# Patient Record
Sex: MALE | Race: BLACK OR AFRICAN AMERICAN | NOT HISPANIC OR LATINO | Employment: FULL TIME | URBAN - METROPOLITAN AREA
[De-identification: names, ages, dates, MRNs, and addresses within clinical notes are randomized per-mention and may not be internally consistent; named-entity substitution may affect disease eponyms.]

---

## 2017-12-29 ENCOUNTER — HOSPITAL ENCOUNTER (EMERGENCY)
Facility: OTHER | Age: 57
Discharge: HOME | End: 2017-12-29
Attending: EMERGENCY MEDICINE
Payer: MEDICAID

## 2017-12-29 VITALS
TEMPERATURE: 98 F | HEIGHT: 70 IN | SYSTOLIC BLOOD PRESSURE: 151 MMHG | RESPIRATION RATE: 19 BRPM | WEIGHT: 180 LBS | DIASTOLIC BLOOD PRESSURE: 87 MMHG | HEART RATE: 85 BPM | BODY MASS INDEX: 25.77 KG/M2 | OXYGEN SATURATION: 100 %

## 2017-12-29 DIAGNOSIS — L73.8 FOLLICULITIS BARBAE: Primary | ICD-10-CM

## 2017-12-29 PROCEDURE — 99283 EMERGENCY DEPT VISIT LOW MDM: CPT

## 2017-12-29 RX ORDER — MINOCYCLINE HYDROCHLORIDE 100 MG/1
100 CAPSULE ORAL EVERY 12 HOURS
Qty: 20 CAPSULE | Refills: 0 | Status: SHIPPED | OUTPATIENT
Start: 2017-12-29 | End: 2018-04-17

## 2017-12-29 NOTE — ED PROVIDER NOTES
Encounter Date: 12/29/2017       History     Chief Complaint   Patient presents with    Rash     reports rash, knots to face and head x  1 week     A 57-year-old male who presents to the ED with complaints of a rash to his face and head for 1 week.  Patient has a history of Barbe folliculitis. He states the rash to his beard has..  Patient also reports a rash to his scalp.  Patient denies fever or chills.  Patient states his doctor usually prescribes minocycline when this flares up.  Patient has a history of Paget's disease.           Review of patient's allergies indicates:  No Known Allergies  Past Medical History:   Diagnosis Date    Paget disease of bone      History reviewed. No pertinent surgical history.  Family History   Problem Relation Age of Onset    Diabetes Mother     Diabetes Father     Hypertension Sister     Hypertension Brother     Hypertension Sister      Social History   Substance Use Topics    Smoking status: Former Smoker    Smokeless tobacco: Never Used    Alcohol use Yes     Review of Systems   Constitutional: Negative for fever.   HENT: Negative for sore throat.    Respiratory: Negative for shortness of breath.    Cardiovascular: Negative for chest pain.   Gastrointestinal: Negative for nausea.   Genitourinary: Negative for dysuria.   Musculoskeletal: Negative for back pain.   Skin: Positive for rash.   Neurological: Negative for weakness.   Hematological: Does not bruise/bleed easily.   All other systems reviewed and are negative.      Physical Exam     Initial Vitals [12/29/17 1509]   BP Pulse Resp Temp SpO2   (!) 151/87 85 19 97.6 °F (36.4 °C) 100 %      MAP       108.33         Physical Exam    Nursing note and vitals reviewed.  Constitutional: Vital signs are normal. He appears well-developed. He is cooperative.  Non-toxic appearance. He does not have a sickly appearance. He does not appear ill. No distress.   HENT:   Head: Normocephalic and atraumatic.   Right Ear: External  ear normal.   Left Ear: External ear normal.   Nose: Nose normal.   Mouth/Throat: Oropharynx is clear and moist.   Follicular inflammation left and right side of beard. No evidence of an abscess.   Scalp with dry patches noted.    Eyes: Conjunctivae and lids are normal. Pupils are equal, round, and reactive to light.   Neck: Normal range of motion. Neck supple.   Cardiovascular: Normal rate, regular rhythm and normal heart sounds.   Pulmonary/Chest: Effort normal and breath sounds normal.   Abdominal: Soft. Normal appearance and bowel sounds are normal. He exhibits no distension. There is no CVA tenderness.   Musculoskeletal: Normal range of motion.   Neurological: He is alert and oriented to person, place, and time.   Skin: Skin is warm, dry and intact.   Psychiatric: He has a normal mood and affect. Cognition and memory are normal.         ED Course   Procedures  Labs Reviewed - No data to display          Medical Decision Making:   Initial Assessment:   A 57-year-old male who presents to the ED with a rash to his face and scalp ×1 week.  Patient denies fever or chills.  Patient has had similar symptoms in the past.  Patient states his dermatologist usually treats them with minocycline.  Patient recently relocated to this area from [place].  Patient needs a referral to dermatologist.   Differential Diagnosis:   Contact dermatitis  Folliculitis  ED Management:  Physical exam.  Pt discharged home on minocycline.  Patient referred to PCP and dermatologist for follow-up in 5 days.  Patient to return to ED if symptoms worsen.                   ED Course      Clinical Impression:   The encounter diagnosis was Folliculitis barbae.                           SOREN Pastrana  12/30/17 1153

## 2017-12-29 NOTE — DISCHARGE INSTRUCTIONS
Use medicated shampoo on scalp.  Follow-up with dermatologist in 5 days or ASAP.  Return to ED if symptoms worsne.

## 2018-03-29 ENCOUNTER — OFFICE VISIT (OUTPATIENT)
Dept: INTERNAL MEDICINE | Facility: CLINIC | Age: 58
End: 2018-03-29
Payer: MEDICAID

## 2018-03-29 ENCOUNTER — LAB VISIT (OUTPATIENT)
Dept: LAB | Facility: HOSPITAL | Age: 58
End: 2018-03-29
Payer: MEDICAID

## 2018-03-29 VITALS
DIASTOLIC BLOOD PRESSURE: 92 MMHG | HEART RATE: 84 BPM | SYSTOLIC BLOOD PRESSURE: 152 MMHG | WEIGHT: 189.13 LBS | BODY MASS INDEX: 27.08 KG/M2 | HEIGHT: 70 IN

## 2018-03-29 DIAGNOSIS — M75.81 TENDINITIS OF RIGHT ROTATOR CUFF: ICD-10-CM

## 2018-03-29 DIAGNOSIS — M88.9 PAGET'S BONE DISEASE: Primary | ICD-10-CM

## 2018-03-29 DIAGNOSIS — R35.0 FREQUENCY OF URINATION: ICD-10-CM

## 2018-03-29 DIAGNOSIS — B35.1 TOENAIL FUNGUS: ICD-10-CM

## 2018-03-29 LAB
ALBUMIN SERPL BCP-MCNC: 4.1 G/DL
ALP SERPL-CCNC: 189 U/L
ALT SERPL W/O P-5'-P-CCNC: 9 U/L
ANION GAP SERPL CALC-SCNC: 8 MMOL/L
AST SERPL-CCNC: 17 U/L
BASOPHILS # BLD AUTO: 0.04 K/UL
BASOPHILS NFR BLD: 0.8 %
BILIRUB SERPL-MCNC: 0.3 MG/DL
BILIRUB UR QL STRIP: NEGATIVE
BUN SERPL-MCNC: 22 MG/DL
CALCIUM SERPL-MCNC: 9.7 MG/DL
CHLORIDE SERPL-SCNC: 103 MMOL/L
CLARITY UR REFRACT.AUTO: CLEAR
CO2 SERPL-SCNC: 30 MMOL/L
COLOR UR AUTO: ABNORMAL
COMPLEXED PSA SERPL-MCNC: 5.6 NG/ML
CREAT SERPL-MCNC: 1.1 MG/DL
DIFFERENTIAL METHOD: ABNORMAL
EOSINOPHIL # BLD AUTO: 0.1 K/UL
EOSINOPHIL NFR BLD: 1.2 %
ERYTHROCYTE [DISTWIDTH] IN BLOOD BY AUTOMATED COUNT: 14.3 %
EST. GFR  (AFRICAN AMERICAN): >60 ML/MIN/1.73 M^2
EST. GFR  (NON AFRICAN AMERICAN): >60 ML/MIN/1.73 M^2
ESTIMATED AVG GLUCOSE: 114 MG/DL
GLUCOSE SERPL-MCNC: 64 MG/DL
GLUCOSE UR QL STRIP: NEGATIVE
HBA1C MFR BLD HPLC: 5.6 %
HCT VFR BLD AUTO: 44.5 %
HGB BLD-MCNC: 14 G/DL
HGB UR QL STRIP: ABNORMAL
KETONES UR QL STRIP: NEGATIVE
LEUKOCYTE ESTERASE UR QL STRIP: NEGATIVE
LYMPHOCYTES # BLD AUTO: 2.2 K/UL
LYMPHOCYTES NFR BLD: 45.4 %
MCH RBC QN AUTO: 27.2 PG
MCHC RBC AUTO-ENTMCNC: 31.5 G/DL
MCV RBC AUTO: 86 FL
MICROSCOPIC COMMENT: NORMAL
MONOCYTES # BLD AUTO: 0.3 K/UL
MONOCYTES NFR BLD: 7 %
NEUTROPHILS # BLD AUTO: 2.2 K/UL
NEUTROPHILS NFR BLD: 45.4 %
NITRITE UR QL STRIP: NEGATIVE
NRBC BLD-RTO: 0 /100 WBC
PH UR STRIP: 6 [PH] (ref 5–8)
PLATELET # BLD AUTO: 235 K/UL
PMV BLD AUTO: 9.9 FL
POTASSIUM SERPL-SCNC: 4.4 MMOL/L
PROT SERPL-MCNC: 8 G/DL
PROT UR QL STRIP: NEGATIVE
RBC # BLD AUTO: 5.15 M/UL
RBC #/AREA URNS AUTO: 1 /HPF (ref 0–4)
SODIUM SERPL-SCNC: 141 MMOL/L
SP GR UR STRIP: 1 (ref 1–1.03)
SQUAMOUS #/AREA URNS AUTO: 0 /HPF
URN SPEC COLLECT METH UR: ABNORMAL
UROBILINOGEN UR STRIP-ACNC: NEGATIVE EU/DL
WBC # BLD AUTO: 4.89 K/UL
WBC #/AREA URNS AUTO: 0 /HPF (ref 0–5)

## 2018-03-29 PROCEDURE — 87086 URINE CULTURE/COLONY COUNT: CPT

## 2018-03-29 PROCEDURE — 85025 COMPLETE CBC W/AUTO DIFF WBC: CPT

## 2018-03-29 PROCEDURE — 81001 URINALYSIS AUTO W/SCOPE: CPT

## 2018-03-29 PROCEDURE — 80053 COMPREHEN METABOLIC PANEL: CPT

## 2018-03-29 PROCEDURE — 99215 OFFICE O/P EST HI 40 MIN: CPT | Mod: PBBFAC | Performed by: HOSPITALIST

## 2018-03-29 PROCEDURE — 83036 HEMOGLOBIN GLYCOSYLATED A1C: CPT

## 2018-03-29 PROCEDURE — 99214 OFFICE O/P EST MOD 30 MIN: CPT | Mod: S$PBB,,, | Performed by: HOSPITALIST

## 2018-03-29 PROCEDURE — 99999 PR PBB SHADOW E&M-EST. PATIENT-LVL V: CPT | Mod: PBBFAC,,, | Performed by: HOSPITALIST

## 2018-03-29 PROCEDURE — 36415 COLL VENOUS BLD VENIPUNCTURE: CPT

## 2018-03-29 PROCEDURE — 84153 ASSAY OF PSA TOTAL: CPT

## 2018-03-29 RX ORDER — PRENATAL VIT 91/IRON/FOLIC/DHA 28-975-200
COMBINATION PACKAGE (EA) ORAL 2 TIMES DAILY
Refills: 0 | COMMUNITY
Start: 2018-03-29 | End: 2019-03-21

## 2018-03-29 NOTE — PATIENT INSTRUCTIONS
Low-Salt Choices  Eating salt (sodium) can make your body retain too much water. Excess water makes your heart work harder. Canned, packaged, and frozen foods are easy to prepare, but they are often high in sodium. Here are some ideas for low-salt foods you can easily prepare yourself.    For Breakfast  · Fruit or fruit juice  · Bread or an English muffin  · Shredded wheat  · Corn tortillas  · Steamed rice, unsalted  · Hot cereal, regular (not instant) made without salt  Stay away from:  · Sausage, rouse, ham  · Flour tortillas  · Packaged muffins, pancakes, and biscuits  For Lunch and Dinner  · Fresh fish, chicken, turkey, or meat-- baked, broiled, or roasted without salt  · Dry beans, cooked without salt  · Tofu, stir-fried without salt  Stay away from:  · Lunch meat  · Cheese  · Tomato juice and catsup  · Canned vegetables, soups, fish  · Packaged gravies and sauces  · Olives, pickles, relish  · Bottled salad dressings  For Snacks and Desserts  · Yogurt  · Popcorn, air popped, unsalted  Stay away from:  · Pies  · Canned and packaged puddings  · Pretzels, chips, crackers, and nuts--unless the label says unsalted  © 6668-0925 [facility], [address] [place] [address]. All rights reserved. This information is not intended as a substitute for professional medical care. Always follow your healthcare professional's instructions.          Exercises for Shoulder Flexibility: Internal Rotation    This stretch can help restore shoulder flexibility and relieve pain over time. When stretching, be sure to breathe deeply. And follow any special instructions from your doctor or physical therapist.  · While seated, move the arm on the side you want to stretch toward the middle of your back. The palm of your hand should face out.  · Cup your other hand under the hand thats behind your back. Gently push your cupped hand upward until you feel the stretch in the shoulder. Try to hold the stretch  for 5 seconds.  · Work up to doing 3 sets of this stretch, 3 times a day. Work up to holding the stretch for 30-60 seconds.  Note: Keep your back straight. Its okay if your hand cant reach the middle of your back. Instead, start the stretch with your hand as close as you can get it to the middle of your back.     Frozen Shoulder  Frozen shoulder is another name for adhesive capsulitis, which causes restricted movement in the shoulder. If you have frozen shoulder, this stretch may cause discomfort, especially when you first get started. A few months may pass before you achieve the results you want. But once your shoulder heals, it almost never becomes frozen again. So stick to your stretching program. If you have any questions, be sure to ask your doctor.   © 3103-4054 [employer]. [address] [place] [address], [place] [address]. All rights reserved. This information is not intended as a substitute for professional medical care. Always follow your healthcare professional's instructions.        Exercises for Shoulder Flexibility: Wall Walk  Improving your flexibility can reduce pain. Stretching exercises also can help increase your range of pain-free motion. Breathe normally when you exercise. And try to use smooth, fluid movements.  Note: Follow any special instructions you are given. If you feel pain, stop the exercise. If the pain continues after stopping, call your healthcare provider.  · Stand with your shoulder about 2 feet from the wall.  · Raise your arm to shoulder level and gently walk your fingers up the wall as high as you can.  · Hold for a few seconds. Then walk your fingers back down.  · Repeat 3 times. Move closer to the wall as you repeat.  · Build up to holding each stretch for 30 seconds.  CAUTION: Do this stretch only if your healthcare provider recommends it. Dont do it when you are first injured.          © 3975-2154 [employer]. [address] [place] [address], [place]  [address]. All rights reserved. This information is not intended as a substitute for professional medical care. Always follow your healthcare professional's instructions.        Exercises for Shoulder Flexibility: Pendulum Exercise   Improving your flexibility can reduce pain. Stretching exercises also can help increase your range of pain-free motion. Breathe normally when you exercise. And try to use smooth, fluid movements.  Follow any special instructions you are given. If you feel pain, stop the exercise. If the pain continues after stopping, call your health care provider.  · Lean over with your good arm supported on a table or chair.  · Relax the arm on the painful side, letting it hang straight down.  · Slowly begin to swing the relaxed arm. Move it in a small [place], gradually making it bigger if you can. Then reverse the direction. Next, move it backward and forward. Finally, move it side to side.     Note: Spend about 5 minutes doing the exercise, 3 times a day. Change direction after 1 minute of motion.   © 6364-9731 [employer]. [address] [place] [address], [place] [address]. All rights reserved. This information is not intended as a substitute for professional medical care. Always follow your healthcare professional's instructions.        Exercises for Shoulder Flexibility: Broom Stretch    Improving your flexibility can reduce pain. Stretching exercises also can help increase your range of pain-free motion. Breathe normally when you exercise. Try to use smooth, fluid movements. Never force a stretch.  · Stand up or lie on the floor. Place the palm of your hand over the end of a broomstick or cane. Grasp the stick farther down with the other hand, palm facing down.  · Push the end of the stick up on the side of your injured shoulder as high as is comfortable.  · Hold for a few seconds. Return to the starting position.  · Repeat 3-5 times. Build up to holding each stretch for 10-30  seconds.  Note:  Follow any special instructions you are given. If you feel pain, stop the exercise. If the pain continues after stopping, call your healthcare provider.   © 9137-8525 [employer]. [address] [place] [address], [place] [address]. All rights reserved. This information is not intended as a substitute for professional medical care. Always follow your healthcare professional's instructions.

## 2018-03-29 NOTE — PROGRESS NOTES
[facility]  CLINIC NOTE    Patient Name: Thaddeus Moss  YOB: 1960    PRESENTING HISTORY       History of Present Illness:  Mr. Thaddeus Moss is a 57 y.o. male presenting with multiple problems as listed below     Paget's bone disease  Was diagnosed in 2007, used to follow Endocrine here and would like to establish care as he was lost to follow up     Tendinitis of right rotator cuff  Started about a month ago, no injuries noted in the recent past. Has ROM restriction but no significant decrease in strength. Has not tried any medical therapy for it yet. Had XRs in the OSH which did not show any fractures     Frequency of urination  Has been on going for years. Increasing in the recent past. No dysuria, pyuria or malodor with the urine. Has constant urge to urinate and does not report to have complete voiding. Denies dribbling or hematuria. No hx of prostate issues known to the patient but his PSA was elevated in 2015. He did not have any follow ups as he moved to .    Toenail fungus  Noticed recently and has tried some OTC cream without significant relief or changes     HTN   Recommended BP meds but would like to try lifestyle and diet changes       Review of Systems:  Constitutional: no fever or chills  Eyes: no visual changes  ENT: no nasal congestion or sore throat  Respiratory: no cough or shortness of breath  Cardiovascular: no chest pain or palpitations  Gastrointestinal: no nausea or vomiting, no abdominal pain or change in bowel habits  Genitourinary: +ve frequency   Musculoskeletal: R shoulder pain with movement   Neurological: no seizures or tremors    PAST HISTORY:     Past Medical History:   Diagnosis Date    Paget disease of bone        No past surgical history on file.    Family History   Problem Relation Age of Onset    Diabetes Mother     Diabetes Father     Hypertension Sister     Hypertension Brother     Hypertension Sister        Social History  "    Social History    Marital status:      Spouse name: N/A    Number of children: N/A    Years of education: N/A     Social History Main Topics    Smoking status: Former Smoker    Smokeless tobacco: Never Used    Alcohol use Yes    Drug use: No    Sexual activity: Yes     Other Topics Concern    None     Social History Narrative    - exercise infrequently       MEDICATIONS & ALLERGIES:     Current Outpatient Prescriptions on File Prior to Visit   Medication Sig    econazole nitrate 1 % cream Apply topically 2 (two) times daily.    minocycline (MINOCIN,DYNACIN) 100 MG capsule Take 1 capsule (100 mg total) by mouth every 12 (twelve) hours.     No current facility-administered medications on file prior to visit.        Review of patient's allergies indicates:  No Known Allergies    OBJECTIVE:   Vital Signs:  Vitals:    03/29/18 1444 03/29/18 1519   BP: (!) 150/96 (!) 152/92   Pulse: 84    Weight: 85.8 kg (189 lb 2.5 oz)    Height: 5' 10" (1.778 m)        No results found for this or any previous visit (from the past 24 hour(s)).      Physical Exam:   General:  Well developed, well nourished, no acute distress  HEENT:  Normocephalic, atraumatic, PERRL, EOMI, clear sclera, ears normal, throat clear without erythema or exudates  CVS:  RRR, S1 and S2 normal, no murmurs, rubs, gallops  Resp:  Lungs clear to auscultation, no wheezes, rales, rhonchi, cough  GI:  Abdomen soft, non-tender, non-distended, normoactive bowel sounds, no masses  MSK:  R shoulder ROM restriction with abduction, ext rot and int rotation. Pronation   Skin:  No rashes, ulcers, erythema  Neuro:  CNII-XII grossly intact  Psych:  Alert and oriented to person, place, and time    ASSESSMENT & PLAN:     Thaddeus was seen today for urinary frequency and shoulder pain.    Diagnoses and all orders for this visit:    Paget's bone disease  -     Ambulatory Referral to Endocrinology  -     Ambulatory Referral to Urology    Tendinitis of right " rotator cuff  -     Ambulatory Referral to Physical/Occupational Therapy    Frequency of urination  -     CBC auto differential; Future  -     Comprehensive metabolic panel; Future  -     Hemoglobin A1c; Future  -     PSA, Screening; Future  -     URINALYSIS  -     Urine culture    Toenail fungus  -     terbinafine HCl (LAMISIL) 1 % cream; Apply topically 2 (two) times daily.      HTN   Recommended BP meds but would like to try lifestyle and diet changes     RTC 1 month to check BP            Citlaly Licona MD         Internal Medicine PGY-3                  # [phone]

## 2018-03-30 LAB — BACTERIA UR CULT: NO GROWTH

## 2018-04-02 ENCOUNTER — TELEPHONE (OUTPATIENT)
Dept: INTERNAL MEDICINE | Facility: CLINIC | Age: 58
End: 2018-04-02

## 2018-04-02 NOTE — TELEPHONE ENCOUNTER
Called pt to inform results. The PSA remains high and pt to be followed up with urology. Also discussed symptoms of hypoglycemia considering his BG was low in CMP. No new complains. Asked him to call back if there are any other problems or unable to get urology ref in time.

## 2018-04-11 ENCOUNTER — CLINICAL SUPPORT (OUTPATIENT)
Dept: REHABILITATION | Facility: HOSPITAL | Age: 58
End: 2018-04-11
Payer: MEDICAID

## 2018-04-11 DIAGNOSIS — M25.611 DECREASED RANGE OF MOTION OF RIGHT SHOULDER: ICD-10-CM

## 2018-04-11 DIAGNOSIS — M25.511 ACUTE PAIN OF RIGHT SHOULDER: ICD-10-CM

## 2018-04-11 DIAGNOSIS — R53.1 DECREASED STRENGTH: ICD-10-CM

## 2018-04-11 DIAGNOSIS — M25.60 DECREASED MOBILITY OF JOINT: ICD-10-CM

## 2018-04-11 PROCEDURE — 97110 THERAPEUTIC EXERCISES: CPT | Mod: PN

## 2018-04-11 PROCEDURE — 97162 PT EVAL MOD COMPLEX 30 MIN: CPT | Mod: PN

## 2018-04-11 NOTE — PLAN OF CARE
"  TIME RECORD    Date: 04/11/2018    Start Time:  9:20 - pt 20 min late for appt  Stop Time:  10:00  Total Timed Minutes:  40 minutes    OUTPATIENT PHYSICAL THERAPY   PATIENT EVALUATION  Onset Date: 6 weeks ago  Primary Diagnosis:   1. Acute pain of right shoulder     2. Decreased range of motion of right shoulder     3. Decreased mobility of joint     4. Decreased strength       Treatment Diagnosis: R shoulder pain with s/s of subacromial bursitis and RTC tendinopathy, decreased ROM, flexibility, joint mobility, strength  Past Medical History:   Diagnosis Date    Paget disease of bone      Precautions: Pagets bone disease in L4.  Prior Therapy: none for c/c  Medications: Thaddeus Moss has a current medication list which includes the following prescription(s): econazole nitrate, minocycline, and terbinafine hcl.  Nutrition:  Normal  History of Present Illness: acute onset, no J LUIS  Prior Level of Function: Independent  Social History: not working currently 2* to c/o pain  Place of Residence (Steps/Adaptations): N/a  Functional Deficits Leading to Referral/Nature of Injury: pain and difficulty with using RUE with all ADLs, HHCs, work-related activiites  Patient Therapy Goals: "reduce pain and be able to move my arm normally"    Subjective     Thaddeus Moss states an insidious onset of R shoulder pain that started 6 weeks ago without J LUIS or trauma. He notes gradual worsening so that it is difficult to raise arm up for daily activities. Pt reports that pain is primarily anteriorly without pain down the R arm. He is R hand dominant.    Pain:  Location: shoulder   Description: Aching and Dull  Activities Which Increase Pain: sleeping on R side, Reaching OH (>90 deg), dressing grooming, reaching behind the back, lifting/ carrying, moving RUE in all directions  Activities Which Decrease Pain: rest and ibuprofen, Exercises given by MD- reaching behind the back, cane exercises - does not do them because they hurt more  Pain " "Scale: 0/10 at best 5/10 now  8/10 at worst    Objective     Posture: increased protraction of R shoulder, mild elevation R>L  Palpation: no TTP  Sensation: intact  DTRs: intact    Shoulder  Right   Left  Pain/Dysfunction with Movement    AROM PROM MMT AROM PROM MMT    flexion 106* 110* 3+ 170 180 5 Pain initiates at 65 deg flexion, worsens with increased elevation  Pain reduced with improved ROM with simultaneous inf glide during PROM   abduction 105* 110 3+ 150 160 5    Internal rotation 1" behind greater trochanter 40 deg at 90 deg abd 4- L1 70 5    ER at 90° abd 45* 45* -- -- 80 -- Increased pain and guarding with PROM, performed in scap position   ER at 0° abd 64* -- 3+ 90 -- 5      Scapular Strength: grossly 4-/5  Flexibility:   Pectoralis Major/Minor: poor  Internal rotators/External rotators: poor with increased guarding    Joint Mobility: UA to accurately assess 2* to increased pain and guarding  Special Tests:   Sulcus Sign: -  Biceps Load II: NT  Compression-Grind: NT  Yergasons Test: -  Apprehension Sign: +  Supraspinatus (Empty Can Test): -  Hayley: UA to assess 2* pain/guarding  Neers: -  Speeds: -    Other: FAQ = 98%, DASH = 35.8% disability, FOTO limitation = 47% disability    Treatment:   Table flexion: 15x  Table scaption: 15x  Table ER: 15x  Supine pec stretch with towel roll: 2 min    Manual therapy: 8 min x R GHJ mobilizations (all directions). Attempted PROM but UA 2* to increased muscular guarding  Patient education: Patient educated regarding pathogenesis, diagnosis, protocol, prognosis, POC, and HEP. Written Home Exercises Provided with written and verbal instructions for frequency and duration of the following exercises: see clinical referance tab and exercise list above. Pt educated on HEP and activity modifications to reduce c/o pain and improve overall function. Pt was educated in posture and body mechanics.  Pt also educated on use of modalities prn to reduce c/o pain and " dysfunction. Patient demo good understanding of the education provided. Patient demo good return demo of skill of exercises.      Assessment       Initial Assessment (Pertinent finding, problem list and factors affecting outcome): Patient presents with R shoulder pain with limited ROM in all directions due to guarding and pain, decreased flexibility, joint mobility, and strength. S/s associated with bursitis and possible RTC tendinopathy. Current impairments limits patient with all functional activities. Patient requires skilled PT to address remaining deficits and return patient to OF. Pt has set realistic goals and has verbalized good understanding and agreement with reported diagnosis, prognosis and treatment. Pt demonstrates no additional cultural, spiritual or educational need and currently has no barriers to learning.     Rehab Potiential: good     Medical necessity is demonstrated by the following problem list.  Pt presents with the following impairments:     History  Co-morbidities and personal factors that may impact the plan of care Examination  Body Structures and Functions, activity limitations and participation restrictions that may impact the plan of care Clinical Presentation   Decision Making/ Complexity Score   Co-morbidities:     Paget's bone disease  Age              Personal Factors:     Occupation  Lifestyle  attitude Body Regions: RUE    Body Systems: Musculoskeletal (ROM, strength, symmetry, joint mobility, soft tissue or myofascial mobility, flexibility), Neuromuscular (postural alignment, body mechanics, balance, gait, transfers, motor control, motor learning), cardiovascular (endurance), Integumentary (skin integrity)    Activity limitations:   Learning and applying knowledge  no deficits    General Tasks and Commands  no deficits    Communication  no deficits    Mobility  lifting and carrying objects    Self care  washing oneself (bathing, drying, washing hands)  caring for body parts  (brushing teeth, shaving, grooming)  dressing  looking after one's health    Domestic Life  shopping  cooking  doing house work (cleaning house, washing dishes, laundry)  assisting others    Interactions/Relationships  no deficits    Life Areas  employment    Community and Social Life  community life  recreation and leisure      Participation Restrictions:   sleeping on R side, Reaching OH (>90 deg), dressing grooming, reaching behind the back, lifting/ carrying, moving RUE in all directions           Evolving clinical presentation with changing clinical characteristics           Moderate      FOTO: 47% disability     Short Term Goals (6 Weeks):   1. Pt to demonstrate improved PROM by 5% to allow pt to perform self care activities with increased ease.  2. Pt to demonstrate improved flexibility by a half grade to allow improved ADLs with increased ease.   3. Pt will report <5/10 pain within the R shoulder for ease with donning/doffing sling.  4. Pt will report being independent with his/her HEP for maintenance of improvements gained during therapy sessions  5. Pt to demonstrate improved functional ability with FOTO limitation <=37% disability.    Long Term Goals (12 Weeks):   1. Pt to demonstrate improved ROM to WNL, R=L, to allow pt to perform self care with increased ease.  2. Pt to demonstrate improved flexibility by a full grade to allow improved postural alignment with increased ease.  3. Pt will demonstrate >=4+/5 strength within the R shoulder for ease with house hold chores  4. Pt to demonstrate improved functional ability with FOTO limitation <=27% disability.  5. Pt independent with HEP and demonstrates good return technique.      Plan     Certification Period: 4/11/18 to 7/11/18  Recommended Treatment Plan: 1-2 times per week for 10-12 weeks, pending pt progression: Cervical/Lumbar Traction, Electrical Stimulation prn, Iontophoresis (with dexamethasone prn), Manual Therapy, Moist Heat/ Ice, Neuromuscular  Re-ed, Patient Education, Self Care, Therapeutic Activites, Therapeutic Exercise and Other IASTYM, dry needling, therapeutic taping, aquatic therapy  Other Recommendations: Progress HEP towards D/C. Recommend F/U with MD if symptoms worsen or do not resolve. Patient may be seen by a PTA for treatment to carry out their plan of care.  Face-to-face conferences will be held.          Therapist: Melissa Carrera, PT    I CERTIFY THE NEED FOR THESE SERVICES FURNISHED UNDER THIS PLAN OF TREATMENT AND WHILE UNDER MY CARE    Physician's comments: ________________________________________________________________________________________________________________________________________________      Physician's Name: ___________________________________

## 2018-04-16 NOTE — PROGRESS NOTES
Physical Therapy Daily Note     Name: Thaddeus [facility] Number: [id number]  Diagnosis:   Encounter Diagnoses   Name Primary?   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  Visit #: 2 of 20   Exp:  03/29/2019  PTA Visit #: 1  Certification Period: 4/11/18 to 7/11/18    Time In: 0846  Time Out: 0943  Total Treatment Time: 57'     Subjective     Pt reports: that his shoulder feels about the same.  Pt states that he tried to do his stretches at home but it was too painful.  Pt states that he doesn't understand why they haven't scheduled his appt with the Ortho yet.  Pt states the pain ranges from 6- 10/10 depending on the motion.   Pain Scale: Thaddeus rates pain on a scale of 0-10 to be 6 currently.    Objective     Thaddeus received individual therapeutic exercises to develop strength, endurance, ROM, flexibility and posture for 39 minutes including:    Pulleys 3'   Table flexion: 15x  Table scaption: 15x  Table ER: 15x  Supine pec stretch with towel roll: 2 min  + SL Scapular retraction 20 x 3'' hold   + SL shoulder flexion to 90 deg 10 x with scapular assist   + SL chicken wings x 20  + SL ER x 15       Thaddeus received the following manual therapy techniques: 8 min x R GHJ mobilizations (all directions). PROM     Modalities:   Ice pack x 5'  Iontophoresis patch to go 6hr wear time.  Pt instructed to remove the patch at 3:30pm  X 5' for application       Written Home Exercises Provided: reviewed from IE  Pt demo good understanding of the education provided. Thaddeus demonstrated good return demonstration of activities.     Education provided re:  Application of removal of Iontophoresis patch after 6 hours ( 1530 ) Pt. Verbalized understanding.   Thaddeus verbalized good understanding of education  provided.   No spiritual or educational barriers to learning provided    Assessment     Patient tolerated treatment session fairly well. Patient presented with increased muscle guarding throughout session requiring moderate cueing for breathing and to promote relaxation.  Pt with empty end-feel during passive range of motion secondary to pain and muscle guarding; Pt resistant towards increasing towards available end-range of motion.  Pt able to perform exercises in side lying in a pain free range of motion without muscle guarding observed.    This is a 57 y.o. male referred to outpatient physical therapy and presents with a medical diagnosis of right shoulder pain and demonstrates limitations as described in the problem list. Pt prognosis is Good. Pt will continue to benefit from skilled outpatient physical therapy to address the deficits listed in the problem list, provide pt/family education and to maximize pt's level of independence in the home and community environment.     Goals as follows:  Short Term Goals (6 Weeks):   1. Pt to demonstrate improved PROM by 5% to allow pt to perform self care activities with increased ease.  2. Pt to demonstrate improved flexibility by a half grade to allow improved ADLs with increased ease.   3. Pt will report <5/10 pain within the R shoulder for ease with donning/doffing sling.  4. Pt will report being independent with his/her HEP for maintenance of improvements gained during therapy sessions  5. Pt to demonstrate improved functional ability with FOTO limitation <=37% disability.     Plan   Certification Period: 4/11/18 to 7/11/18  ( PN due 5/11)   Continue with established Plan of Care towards PT goals.    Therapist: Lara Velázquez, PTA  4/16/2018

## 2018-04-17 ENCOUNTER — CLINICAL SUPPORT (OUTPATIENT)
Dept: REHABILITATION | Facility: HOSPITAL | Age: 58
End: 2018-04-17
Payer: MEDICAID

## 2018-04-17 ENCOUNTER — OFFICE VISIT (OUTPATIENT)
Dept: ENDOCRINOLOGY | Facility: CLINIC | Age: 58
End: 2018-04-17
Payer: MEDICAID

## 2018-04-17 ENCOUNTER — OFFICE VISIT (OUTPATIENT)
Dept: UROLOGY | Facility: CLINIC | Age: 58
End: 2018-04-17
Payer: MEDICAID

## 2018-04-17 ENCOUNTER — TELEPHONE (OUTPATIENT)
Dept: INTERNAL MEDICINE | Facility: CLINIC | Age: 58
End: 2018-04-17

## 2018-04-17 VITALS
HEART RATE: 90 BPM | BODY MASS INDEX: 27.05 KG/M2 | SYSTOLIC BLOOD PRESSURE: 162 MMHG | HEIGHT: 70 IN | DIASTOLIC BLOOD PRESSURE: 100 MMHG | WEIGHT: 188.94 LBS

## 2018-04-17 VITALS — DIASTOLIC BLOOD PRESSURE: 110 MMHG | SYSTOLIC BLOOD PRESSURE: 163 MMHG | HEART RATE: 80 BPM

## 2018-04-17 DIAGNOSIS — R74.8 ELEVATED ALKALINE PHOSPHATASE LEVEL: ICD-10-CM

## 2018-04-17 DIAGNOSIS — R97.20 ELEVATED PSA: ICD-10-CM

## 2018-04-17 DIAGNOSIS — M25.60 DECREASED MOBILITY OF JOINT: ICD-10-CM

## 2018-04-17 DIAGNOSIS — R35.0 FREQUENCY OF URINATION: ICD-10-CM

## 2018-04-17 DIAGNOSIS — R39.15 URGENCY OF URINATION: Primary | ICD-10-CM

## 2018-04-17 DIAGNOSIS — R53.1 DECREASED STRENGTH: ICD-10-CM

## 2018-04-17 DIAGNOSIS — M25.611 DECREASED RANGE OF MOTION OF RIGHT SHOULDER: ICD-10-CM

## 2018-04-17 DIAGNOSIS — I10 HYPERTENSION, UNSPECIFIED TYPE: ICD-10-CM

## 2018-04-17 DIAGNOSIS — R35.1 NOCTURIA: ICD-10-CM

## 2018-04-17 DIAGNOSIS — M88.9 PAGET DISEASE OF BONE: Primary | ICD-10-CM

## 2018-04-17 DIAGNOSIS — M25.511 ACUTE PAIN OF RIGHT SHOULDER: Primary | ICD-10-CM

## 2018-04-17 PROCEDURE — 99999 PR PBB SHADOW E&M-EST. PATIENT-LVL III: CPT | Mod: PBBFAC,,, | Performed by: INTERNAL MEDICINE

## 2018-04-17 PROCEDURE — 99999 PR PBB SHADOW E&M-EST. PATIENT-LVL II: CPT | Mod: PBBFAC,,, | Performed by: PHYSICIAN ASSISTANT

## 2018-04-17 PROCEDURE — 99212 OFFICE O/P EST SF 10 MIN: CPT | Mod: PBBFAC,25 | Performed by: PHYSICIAN ASSISTANT

## 2018-04-17 PROCEDURE — 99214 OFFICE O/P EST MOD 30 MIN: CPT | Mod: S$PBB,,, | Performed by: INTERNAL MEDICINE

## 2018-04-17 PROCEDURE — 99213 OFFICE O/P EST LOW 20 MIN: CPT | Mod: PBBFAC,27,25 | Performed by: INTERNAL MEDICINE

## 2018-04-17 PROCEDURE — 97110 THERAPEUTIC EXERCISES: CPT | Mod: PN

## 2018-04-17 PROCEDURE — 99214 OFFICE O/P EST MOD 30 MIN: CPT | Mod: S$PBB,,, | Performed by: PHYSICIAN ASSISTANT

## 2018-04-17 PROCEDURE — 51798 US URINE CAPACITY MEASURE: CPT | Mod: PBBFAC | Performed by: PHYSICIAN ASSISTANT

## 2018-04-17 RX ORDER — TAMSULOSIN HYDROCHLORIDE 0.4 MG/1
0.4 CAPSULE ORAL NIGHTLY
Qty: 30 CAPSULE | Refills: 11 | Status: SHIPPED | OUTPATIENT
Start: 2018-04-17 | End: 2019-03-21

## 2018-04-17 RX ORDER — AMLODIPINE BESYLATE 5 MG/1
5 TABLET ORAL DAILY
Qty: 30 TABLET | Refills: 3 | Status: SHIPPED | OUTPATIENT
Start: 2018-04-17 | End: 2019-03-21 | Stop reason: SDUPTHER

## 2018-04-17 NOTE — LETTER
April 17, 2018      AMPARO Moulton  [address] [address]           [facility] - Urology 4th Floor  [address]  [place] [address]  Phone: [phone]          Patient: Thaddeus Moss   MR Number: [id number]   YOB: 1960   Date of Visit: 4/17/2018       Dear Dr. Citlaly Licona:    Thank you for referring Thaddeus Moss to me for evaluation. Attached you will find relevant portions of my assessment and plan of care.    If you have questions, please do not hesitate to call me. I look forward to following Thaddeus Moss along with you.    Sincerely,    Karley Monsivais PA-C    Enclosure  CC:  No Recipients    If you would like to receive this communication electronically, please contact [email or web address] or [phone] to request more information on [email or web address] access.    For providers and/or their staff who would like to refer a patient to [facility], please contact us through our one-stop-shop provider referral line, [facility] , at [phone].    If you feel you have received this communication in error or would no longer like to receive these types of communications, please e-mail [email or web address]
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
## 2018-04-17 NOTE — PROGRESS NOTES
CHIEF COMPLAINT:    Mr. Moss is a 57 y.o. male presenting for urinary frequency.    PRESENTING ILLNESS:    Thaddeus Moss is a 57 y.o. male with a PMH of urgency who presents for urinary frequency.  He reports urinary frequency q 15-30 minutes.  He reports nocturia x 2-4.  His symptoms have gotten worse over the last 6 months.  He reports urgency and urge incontinence.  His stream is normal.  He has intermittency sometimes.  He denies hesitancy, dysuria.  He drinks 2-4 cups of coffee in the morning.  He drinks a cold drink every day or so.  He does not limit his fluid intake before bed.    He was seen in 2015 for urinary urgency by EMMANUEL Silverio.  He was treated with cipro for prostatitis but can't remember if it helped.   He denies a family and personal history of prostate cancer.   He reports a history of elevated psa.      REVIEW OF SYSTEMS:    Constitutional: Negative for fever and chills.   HENT: Negative for hearing loss.   Eyes: Negative for visual disturbance.   Respiratory: Negative for shortness of breath.   Cardiovascular: Negative for chest pain.   Gastrointestinal: Negative for vomiting, and constipation.   Genitourinary:  See HPI  Neurological: Negative for dizziness.   Hematological: Does not bruise/bleed easily.   Psychiatric/Behavioral: Negative for confusion.       PATIENT HISTORY:    Past Medical History:   Diagnosis Date    Paget disease of bone        History reviewed. No pertinent surgical history.    Family History   Problem Relation Age of Onset    Diabetes Mother     Diabetes Father     Hypertension Sister     Hypertension Brother     Hypertension Sister        Social History     Social History    Marital status:      Spouse name: N/A    Number of children: N/A    Years of education: N/A     Occupational History    Not on file.     Social History Main Topics    Smoking status: Former Smoker    Smokeless tobacco: Never Used    Alcohol use Yes    Drug use: No    Sexual  activity: Yes     Other Topics Concern    Not on file     Social History Narrative    - exercise infrequently       Allergies:  Patient has no known allergies.    Medications:    Current Outpatient Prescriptions:     amLODIPine (NORVASC) 5 MG tablet, Take 1 tablet (5 mg total) by mouth once daily., Disp: 30 tablet, Rfl: 3    econazole nitrate 1 % cream, Apply topically 2 (two) times daily., Disp: 85 g, Rfl: 1    tamsulosin (FLOMAX) 0.4 mg Cp24, Take 1 capsule (0.4 mg total) by mouth every evening., Disp: 30 capsule, Rfl: 11    terbinafine HCl (LAMISIL) 1 % cream, Apply topically 2 (two) times daily., Disp: , Rfl: 0    PHYSICAL EXAMINATION:    Constitutional: He appears well-developed and well-nourished.  He is in no apparent distress.    Eyes: No scleral icterus noted bilaterally.  No discharge noted bilaterally.      Cardiovascular: Normal rate.  No pitting edema noted in lower extremities bilaterally    Pulmonary/Chest: Effort normal. No respiratory distress.     Abdominal:  He exhibits no distension.  There is no CVA tenderness.     Lymphadenopathy:        Right: No supraclavicular adenopathy present.        Left: No supraclavicular adenopathy present.     Neurological: He is alert and oriented to person, place, and time.     Skin: Skin is warm and dry.     Psych: Cooperative with normal affect.    Genitourinary: Patient declined EMMA.    Physical Exam    Bladder scan performed by Nurse Kiet.  PVR 19ml  LABS:    U/a: 1.010, pH 7, negative.  Lab Results   Component Value Date    PSA 5.6 (H) 03/29/2018    PSA 5.5 (H) 05/06/2015    PSA 2.08 02/14/2012       IMPRESSION:    Encounter Diagnoses   Name Primary?    Elevated PSA Yes    Urgency of urination     Frequency of urination     Nocturia          PLAN:  Trial of flomax.  Instructed patient to take 1st dose at night when in bed due to potential 1st dose syncope episode.  Patient was also informed and educated on side effects including retrograde  ejaculation.  Discussed bladder irritants such as beverages containing caffeine and alcohol.  Patient instructed to avoid such irritants.    Limit fluid intake 3 hours prior to bed.    Discussed elevated PSA and factors that can cause PSA to be elevated.  Will repeat Psa next week.  He was instructed not to ejaculate or bike ride 3-4 days before repeat lab. Would recommend prostate biopsy if PSA remains elevated. Discussed prostate biopsy in detail.  Patient declined prostate exam.  He was counseled that there is a chance that prostate cancer can go undiagnosed if EMMA is not done.      Follow up in 1 month to reassess urinary symptoms.       Karley Monsivais PA-C

## 2018-04-17 NOTE — TELEPHONE ENCOUNTER
----- Message from Roberto Butlerr sent at 4/17/2018  1:29 PM CDT -----  Contact: self/ [phone] cell  Type: Referral to a Specialist    Where would you like a referral to? Orthopedics    Have you previously requested? No    Is the physician within the [facility]? Yes    Name and phone number of specialist: Advisement of the PCP    Reason for appointment: Right shoulder pain    Is an appointment scheduled with specialist? When? No    Comments: Pt was seen in the office several weeks ago for right shoulder pain and was called to start PT.  The pt states that the therapy is not helping and would like a referral to the specialist above.  He would like the referral put in as soon as possible.  Please call and advise.    Thank you

## 2018-04-17 NOTE — PATIENT INSTRUCTIONS
Buy blood pressure cuff - check blood pressure daily    Find primary care    Get on endocrine waiting list    Schedule MRI and bone scan

## 2018-04-18 ENCOUNTER — TELEPHONE (OUTPATIENT)
Dept: ENDOCRINOLOGY | Facility: CLINIC | Age: 58
End: 2018-04-18

## 2018-04-18 ENCOUNTER — HOSPITAL ENCOUNTER (OUTPATIENT)
Dept: RADIOLOGY | Facility: HOSPITAL | Age: 58
Discharge: HOME | End: 2018-04-18
Attending: INTERNAL MEDICINE
Payer: MEDICAID

## 2018-04-18 DIAGNOSIS — R74.8 ELEVATED ALKALINE PHOSPHATASE LEVEL: ICD-10-CM

## 2018-04-18 DIAGNOSIS — M88.9 PAGET'S BONE DISEASE: Primary | ICD-10-CM

## 2018-04-18 DIAGNOSIS — M25.519 SHOULDER PAIN, UNSPECIFIED CHRONICITY, UNSPECIFIED LATERALITY: Primary | ICD-10-CM

## 2018-04-18 DIAGNOSIS — M88.9 PAGET DISEASE OF BONE: ICD-10-CM

## 2018-04-18 PROCEDURE — 72148 MRI LUMBAR SPINE W/O DYE: CPT | Mod: 26,,, | Performed by: RADIOLOGY

## 2018-04-18 PROCEDURE — 72148 MRI LUMBAR SPINE W/O DYE: CPT | Mod: TC

## 2018-04-18 NOTE — TELEPHONE ENCOUNTER
Spoke to patient and let him know that Dr Randall reviewed his MRI back yesterday shows stable Paget's disease? There has been no change since previous.      Also could we have him come back to draw some labs sometime this week?  The lab did not draw everything Dr Randall ordered yesterday by mistake.  Dr Randall will discuss with him further regarding treatment plan after those results are back. Patient said he will get labs dawned tomorrow.

## 2018-04-18 NOTE — TELEPHONE ENCOUNTER
Hi - could we let patient know his MRI back yesterday shows stable Paget's disease? There has been no change since previous.     Also could we have him come back to draw some labs sometime this week?  The lab did not draw everything I ordered yesterday by mistake.  I will discuss with him further regarding treatment plan after those results are back.     Thank you.

## 2018-05-01 DIAGNOSIS — N28.1 RENAL CYST, LEFT: Primary | ICD-10-CM

## 2018-05-02 ENCOUNTER — HOSPITAL ENCOUNTER (OUTPATIENT)
Dept: RADIOLOGY | Facility: HOSPITAL | Age: 58
Discharge: HOME | End: 2018-05-02
Attending: PHYSICIAN ASSISTANT
Payer: MEDICAID

## 2018-05-02 ENCOUNTER — OFFICE VISIT (OUTPATIENT)
Dept: SPORTS MEDICINE | Facility: CLINIC | Age: 58
End: 2018-05-02
Payer: MEDICAID

## 2018-05-02 VITALS
SYSTOLIC BLOOD PRESSURE: 135 MMHG | BODY MASS INDEX: 26.92 KG/M2 | HEART RATE: 88 BPM | DIASTOLIC BLOOD PRESSURE: 89 MMHG | HEIGHT: 70 IN | WEIGHT: 188 LBS

## 2018-05-02 DIAGNOSIS — M25.511 RIGHT SHOULDER PAIN, UNSPECIFIED CHRONICITY: Primary | ICD-10-CM

## 2018-05-02 DIAGNOSIS — M25.511 RIGHT SHOULDER PAIN, UNSPECIFIED CHRONICITY: ICD-10-CM

## 2018-05-02 PROCEDURE — 99999 PR PBB SHADOW E&M-EST. PATIENT-LVL III: CPT | Mod: PBBFAC,,, | Performed by: PHYSICIAN ASSISTANT

## 2018-05-02 PROCEDURE — 99203 OFFICE O/P NEW LOW 30 MIN: CPT | Mod: S$PBB,,, | Performed by: PHYSICIAN ASSISTANT

## 2018-05-02 PROCEDURE — 99213 OFFICE O/P EST LOW 20 MIN: CPT | Mod: PBBFAC,25,PO | Performed by: PHYSICIAN ASSISTANT

## 2018-05-02 PROCEDURE — 73030 X-RAY EXAM OF SHOULDER: CPT | Mod: TC,FY,PO,RT

## 2018-05-02 PROCEDURE — 73030 X-RAY EXAM OF SHOULDER: CPT | Mod: 26,RT,, | Performed by: RADIOLOGY

## 2018-05-02 RX ORDER — CLINDAMYCIN PHOSPHATE 10 UG/ML
LOTION TOPICAL 2 TIMES DAILY
COMMUNITY
End: 2019-03-21

## 2018-05-02 RX ORDER — CELECOXIB 200 MG/1
200 CAPSULE ORAL 2 TIMES DAILY
Qty: 60 CAPSULE | Refills: 0 | Status: SHIPPED | OUTPATIENT
Start: 2018-05-02 | End: 2018-06-01

## 2018-05-02 NOTE — PATIENT INSTRUCTIONS
Frozen Shoulder (Adhesive Capsulitis)     Frozen shoulder is when pain and stiffness make it difficult to move your shoulder normally. This condition is also called adhesive capsulitis.  The shoulder is a ball-and-socket joint. The ball on the upper arm bone fits into a socket on the shoulder blade. The joint is covered by strong connective tissue called the shoulder capsule.  If the shoulder capsule becomes inflamed and swollen, movement is painful. Bands of scar tissue form on the joints surface. This limits your shoulder's range of motion.  In most cases, only one shoulder at a time is affected. Some people may get frozen shoulder in the other shoulder, at a later time.  Frozen shoulder develops slowly in 3 stages. Each stage can last a few months or longer:  1. Painful stage. Pain occurs when raising your arm up or to the side, or when reaching behind your back. Your range of motion decreases. The pain may be worse at night and keep you from sleeping.  2. Frozen stage. Shoulder may be less painful, but it is stiffer. Range of motion is very limited.  3. Thawing stage. Range of motion starts to improve with treatment.  Experts dont know what causes frozen shoulder. It may occur after an injury such as a fracture. Or it may happen if the shoulder is immobile for a long time, such as after surgery. It may also be an autoimmune response. Risk factors include being over age 40, or having diabetes, heart disease, lung disease, or an overactive thyroid.  The diagnosis is made by physical exam and an X-ray of the shoulder joint. Sometimes an MRI is done to look for other causes.  Mild cases may be treated with a home exercise program and anti-inflammatory medicines. More severe cases require physical therapy. In some cases a steroid is shot, or injected, into the shoulder area. In hard to treat cases, forced movement of the shoulder is done while you are asleep under general anesthesia. This will break up scar  tissue and increase your range of motion. In rare cases, surgery may be needed to remove the scar tissue. Over time, most people with frozen shoulder get back nearly all range of motion without pain. Recovery may take a few months up to 1 year. You may still feel some stiffness.  Home care  · If physical therapy was prescribed, keep up with any home exercise program you were given.  · Keep using the affected shoulder and arm as much as possible.  · You may use over-the-counter pain medicine to control pain, unless another pain medicine was prescribed. Anti-inflammatory pain medicines may work better than acetaminophen. If you have chronic liver or kidney disease or ever had a stomach ulcer or GI bleeding, talk with your healthcare provider before using these medicines.  Follow-up care  Follow up with your healthcare provider, or as advised. Or follow up sooner if pain increases or your shoulder motion decreases.  If X-rays or an MRI were done, you will be told of any new findings that may affect your care.  When to seek medical advice  Call your healthcare provider right away if any of these occur:  · Your shoulder is red or swollen  · Your arm feels weak or numb  · Your shoulder movement decreases  · Your shoulder pain increases even after using pain medicines  Date Last Reviewed: 11/24/2015  © 2753-9753 [employer]. [address] [place] [address], [place] [address]. All rights reserved. This information is not intended as a substitute for professional medical care. Always follow your healthcare professional's instructions.

## 2018-05-02 NOTE — PROGRESS NOTES
CC: RIGHT shoulder pain     57 y.o. Male with a history of right shoulder pain x several months. No known injury or trauma. Reaching to back pocket or lifting arm to put on deodorant is significantly painful. No prior surgery.  He states that the pain is severe and not responding to any conservative care.      He reports that the pain and weakness is worse with overhead activity. It also bothers him at night.    Is affecting ADLs.  Pain is 10/10 at it's worst. 5/10 at rest. Pain is constant.       Past Medical History:   Diagnosis Date    Paget disease of bone        History reviewed. No pertinent surgical history.    Family History   Problem Relation Age of Onset    Diabetes Mother     Diabetes Father     Hypertension Sister     Hypertension Brother     Hypertension Sister          Current Outpatient Prescriptions:     amLODIPine (NORVASC) 5 MG tablet, Take 1 tablet (5 mg total) by mouth once daily., Disp: 30 tablet, Rfl: 3    clindamycin (CLEOCIN T) 1 % lotion, Apply topically 2 (two) times daily., Disp: , Rfl:     tamsulosin (FLOMAX) 0.4 mg Cp24, Take 1 capsule (0.4 mg total) by mouth every evening., Disp: 30 capsule, Rfl: 11    terbinafine HCl (LAMISIL) 1 % cream, Apply topically 2 (two) times daily., Disp: , Rfl: 0    econazole nitrate 1 % cream, Apply topically 2 (two) times daily., Disp: 85 g, Rfl: 1    Review of patient's allergies indicates:  No Known Allergies       REVIEW OF SYSTEMS:  Constitution: Negative. Negative for chills, fever and night sweats.   HENT: Negative for congestion and headaches.    Eyes: Negative for blurred vision, left vision loss and right vision loss.   Cardiovascular: Negative for chest pain and syncope.   Respiratory: Negative for cough and shortness of breath.    Endocrine: Negative for polydipsia, polyphagia and polyuria.   Hematologic/Lymphatic: Negative for bleeding problem. Does not bruise/bleed easily.   Skin: Negative for dry skin, itching and rash.  "  Musculoskeletal: Negative for falls.  Positive for right shoulder pain and muscle weakness.   Gastrointestinal: Negative for abdominal pain and bowel incontinence.   Genitourinary: Negative for bladder incontinence and nocturia.   Neurological: Negative for disturbances in coordination, loss of balance and seizures.   Psychiatric/Behavioral: Negative for depression. The patient does not have insomnia.    Allergic/Immunologic: Negative for hives and persistent infections.      PHYSICAL EXAMINATION:  Vitals:  /89   Pulse 88   Ht 5' 10" (1.778 m)   Wt 85.3 kg (188 lb)   BMI 26.98 kg/m²    General: The patient is alert and oriented x 3.  Mood is pleasant.  Observation of ears, eyes and nose reveal no gross abnormalities.  No labored breathing observed.  Gait is coordinated. Patient can toe walk and heel walk without difficulty.      RIGHT Shoulder / Upper Extremity Exam    OBSERVATION:     Swelling  none  Deformity  none   Discoloration  none   Scapular winging none   Scars   none  Atrophy  none    TENDERNESS / CREPITUS (T/C):          T/C      T/C   Clavicle   -/-  SUPRAspinatus    -/-     AC Jt.    -/-  INFRAspinatus  -/-    SC Jt.    -/-  Deltoid    -/-      G. Tuberosity  -/-  LH BICEP groove  -/-   Acromion:  -/-  Midline Neck   -/-     Scapular Spine -/-  Trapezium   -/-   SMA Scapula  -/-  GH jt. line - post  -/-     Scapulothoracic  -/-         ROM: (* = with pain)  Left shoulder   Right shoulder        AROM (PROM)   AROM (PROM)   FE    170° (175°)     120° (130°)     ER at 0°    80°  (85°)    20°  (25°)   ER at 90° ABD  100°  (100°)    60°  (65°)    IR (spine level)   T10     buttock    STRENGTH: (* = with pain) Left shoulder   Right shoulder   SCAPTION   5/5    5*/5    IR    5/5    5*/5   ER    5/5    5*/5   BICEPS   5/5    5/5   Deltoid    5/5    5/5     SIGNS:  Painful side       NEER   +   OROSARIOS  neg    SUMMERS   +   SPEEDS  pos     DROP ARM   -   BELLY PRESS neg   Superior escape none "    LIFT-OFF  neg   X-Body ADD    pos    MOVING VALGUS neg        EXTREMITY NEURO-VASCULAR EXAM:    Sensation grossly intact to light touch all dermatomal regions.    DTR 2+ Biceps, Triceps, BR and Negative Martins sign   Grossly intact motor function at Elbow, Wrist and Hand   Distal pulses radial and ulnar 2+, brisk cap refill, symmetric.      NECK:  Painless FROM and spinous processes non-tender. Negative Spurlings sign.      OTHER FINDINGS:  + scapular dyskinesia    XRAYS:  Xrays including AP, Outlet and Axillary Lateral of Left shoulder are ordered / images reviewed by me:   No fracture dislocation or other pathology   Proximal migration of humeral head: None   GH arthritis: Mild    ASSESSMENT:   Right shoulder pain, likely adhesive capsulitis    PLAN:      1. MRI to rule out rotator cuff tear vs adhesive capsulitis  2. Start celebrex  3. PT at [facility]  4. RTC to review MRI results    All questions were answered, patient will contact us for questions or concerns in the interim.

## 2018-05-09 ENCOUNTER — HOSPITAL ENCOUNTER (OUTPATIENT)
Dept: RADIOLOGY | Facility: HOSPITAL | Age: 58
Discharge: HOME | End: 2018-05-09
Attending: PHYSICIAN ASSISTANT
Payer: MEDICAID

## 2018-05-09 DIAGNOSIS — M25.511 RIGHT SHOULDER PAIN, UNSPECIFIED CHRONICITY: ICD-10-CM

## 2018-05-09 PROCEDURE — 73221 MRI JOINT UPR EXTREM W/O DYE: CPT | Mod: TC,RT

## 2018-05-09 PROCEDURE — 73221 MRI JOINT UPR EXTREM W/O DYE: CPT | Mod: 26,RT,, | Performed by: RADIOLOGY

## 2018-05-11 ENCOUNTER — OFFICE VISIT (OUTPATIENT)
Dept: SPORTS MEDICINE | Facility: CLINIC | Age: 58
End: 2018-05-11
Payer: MEDICAID

## 2018-05-11 VITALS
BODY MASS INDEX: 26.92 KG/M2 | WEIGHT: 188 LBS | DIASTOLIC BLOOD PRESSURE: 89 MMHG | HEART RATE: 79 BPM | HEIGHT: 70 IN | SYSTOLIC BLOOD PRESSURE: 128 MMHG

## 2018-05-11 DIAGNOSIS — M75.01 ADHESIVE CAPSULITIS OF RIGHT SHOULDER: Primary | ICD-10-CM

## 2018-05-11 PROCEDURE — 99999 PR PBB SHADOW E&M-EST. PATIENT-LVL III: CPT | Mod: PBBFAC,,, | Performed by: PHYSICIAN ASSISTANT

## 2018-05-11 PROCEDURE — 99214 OFFICE O/P EST MOD 30 MIN: CPT | Mod: S$PBB,,, | Performed by: PHYSICIAN ASSISTANT

## 2018-05-11 PROCEDURE — 99213 OFFICE O/P EST LOW 20 MIN: CPT | Mod: PBBFAC,PO | Performed by: PHYSICIAN ASSISTANT

## 2018-05-11 NOTE — PATIENT INSTRUCTIONS
Frozen Shoulder (Adhesive Capsulitis)     Frozen shoulder is when pain and stiffness make it difficult to move your shoulder normally. This condition is also called adhesive capsulitis.  The shoulder is a ball-and-socket joint. The ball on the upper arm bone fits into a socket on the shoulder blade. The joint is covered by strong connective tissue called the shoulder capsule.  If the shoulder capsule becomes inflamed and swollen, movement is painful. Bands of scar tissue form on the joints surface. This limits your shoulder's range of motion.  In most cases, only one shoulder at a time is affected. Some people may get frozen shoulder in the other shoulder, at a later time.  Frozen shoulder develops slowly in 3 stages. Each stage can last a few months or longer:  1. Painful stage. Pain occurs when raising your arm up or to the side, or when reaching behind your back. Your range of motion decreases. The pain may be worse at night and keep you from sleeping.  2. Frozen stage. Shoulder may be less painful, but it is stiffer. Range of motion is very limited.  3. Thawing stage. Range of motion starts to improve with treatment.  Experts dont know what causes frozen shoulder. It may occur after an injury such as a fracture. Or it may happen if the shoulder is immobile for a long time, such as after surgery. It may also be an autoimmune response. Risk factors include being over age 40, or having diabetes, heart disease, lung disease, or an overactive thyroid.  The diagnosis is made by physical exam and an X-ray of the shoulder joint. Sometimes an MRI is done to look for other causes.  Mild cases may be treated with a home exercise program and anti-inflammatory medicines. More severe cases require physical therapy. In some cases a steroid is shot, or injected, into the shoulder area. In hard to treat cases, forced movement of the shoulder is done while you are asleep under general anesthesia. This will break up scar  tissue and increase your range of motion. In rare cases, surgery may be needed to remove the scar tissue. Over time, most people with frozen shoulder get back nearly all range of motion without pain. Recovery may take a few months up to 1 year. You may still feel some stiffness.  Home care  · If physical therapy was prescribed, keep up with any home exercise program you were given.  · Keep using the affected shoulder and arm as much as possible.  · You may use over-the-counter pain medicine to control pain, unless another pain medicine was prescribed. Anti-inflammatory pain medicines may work better than acetaminophen. If you have chronic liver or kidney disease or ever had a stomach ulcer or GI bleeding, talk with your healthcare provider before using these medicines.  Follow-up care  Follow up with your healthcare provider, or as advised. Or follow up sooner if pain increases or your shoulder motion decreases.  If X-rays or an MRI were done, you will be told of any new findings that may affect your care.  When to seek medical advice  Call your healthcare provider right away if any of these occur:  · Your shoulder is red or swollen  · Your arm feels weak or numb  · Your shoulder movement decreases  · Your shoulder pain increases even after using pain medicines  Date Last Reviewed: 11/24/2015  © 3133-6127 [employer]. [address] [place] [address], [place] [address]. All rights reserved. This information is not intended as a substitute for professional medical care. Always follow your healthcare professional's instructions.

## 2018-05-11 NOTE — PROGRESS NOTES
CC: RIGHT shoulder pain     57 y.o. Male RHD with a history of right shoulder pain x several months. No known injury or trauma. Reaching to back pocket or lifting arm to put on deodorant is significantly painful. No prior surgery.  He states that the pain is severe and not responding to any conservative care.      He reports that the pain and weakness is worse with overhead activity. It also bothers him at night.    Is affecting ADLs.  Pain is 10/10 at it's worst. 5/10 at rest. Pain is constant.       Past Medical History:   Diagnosis Date    Paget disease of bone        History reviewed. No pertinent surgical history.    Family History   Problem Relation Age of Onset    Diabetes Mother     Diabetes Father     Hypertension Sister     Hypertension Brother     Hypertension Sister          Current Outpatient Prescriptions:     amLODIPine (NORVASC) 5 MG tablet, Take 1 tablet (5 mg total) by mouth once daily., Disp: 30 tablet, Rfl: 3    celecoxib (CELEBREX) 200 MG capsule, Take 1 capsule (200 mg total) by mouth 2 (two) times daily., Disp: 60 capsule, Rfl: 0    clindamycin (CLEOCIN T) 1 % lotion, Apply topically 2 (two) times daily., Disp: , Rfl:     tamsulosin (FLOMAX) 0.4 mg Cp24, Take 1 capsule (0.4 mg total) by mouth every evening., Disp: 30 capsule, Rfl: 11    terbinafine HCl (LAMISIL) 1 % cream, Apply topically 2 (two) times daily., Disp: , Rfl: 0    econazole nitrate 1 % cream, Apply topically 2 (two) times daily., Disp: 85 g, Rfl: 1    Review of patient's allergies indicates:  No Known Allergies       REVIEW OF SYSTEMS:  Constitution: Negative. Negative for chills, fever and night sweats.   HENT: Negative for congestion and headaches.    Eyes: Negative for blurred vision, left vision loss and right vision loss.   Cardiovascular: Negative for chest pain and syncope.   Respiratory: Negative for cough and shortness of breath.    Endocrine: Negative for polydipsia, polyphagia and polyuria.  "  Hematologic/Lymphatic: Negative for bleeding problem. Does not bruise/bleed easily.   Skin: Negative for dry skin, itching and rash.   Musculoskeletal: Negative for falls.  Positive for right shoulder pain and muscle weakness.   Gastrointestinal: Negative for abdominal pain and bowel incontinence.   Genitourinary: Negative for bladder incontinence and nocturia.   Neurological: Negative for disturbances in coordination, loss of balance and seizures.   Psychiatric/Behavioral: Negative for depression. The patient does not have insomnia.    Allergic/Immunologic: Negative for hives and persistent infections.      PHYSICAL EXAMINATION:  Vitals:  /89   Pulse 79   Ht 5' 10" (1.778 m)   Wt 85.3 kg (188 lb)   BMI 26.98 kg/m²    General: The patient is alert and oriented x 3.  Mood is pleasant.  Observation of ears, eyes and nose reveal no gross abnormalities.  No labored breathing observed.  Gait is coordinated. Patient can toe walk and heel walk without difficulty.      RIGHT Shoulder / Upper Extremity Exam    OBSERVATION:     Swelling  none  Deformity  none   Discoloration  none   Scapular winging none   Scars   none  Atrophy  none    TENDERNESS / CREPITUS (T/C):          T/C      T/C   Clavicle   -/-  SUPRAspinatus    -/-     AC Jt.    -/-  INFRAspinatus  -/-    SC Jt.    -/-  Deltoid    -/-      G. Tuberosity  -/-  LH BICEP groove  -/-   Acromion:  -/-  Midline Neck   -/-     Scapular Spine -/-  Trapezium   -/-   SMA Scapula  -/-  GH jt. line - post  -/-     Scapulothoracic  -/-         ROM: (* = with pain)  Left shoulder   Right shoulder        AROM (PROM)   AROM (PROM)   FE    170° (175°)     120° (130°)     ER at 0°    80°  (85°)    20°  (25°)   ER at 90° ABD  100°  (100°)    60°  (65°)    IR (spine level)   T10     buttock    STRENGTH: (* = with pain) Left shoulder   Right shoulder   SCAPTION   5/5    5*/5    IR    5/5    5*/5   ER    5/5    5*/5   BICEPS   5/5    5/5   Deltoid    5/5    5/5     SIGNS: "  Painful side       NEER   +   OROSARIOS  neg    SUMMERS   +   SPEEDS  pos     DROP ARM   -   BELLY PRESS neg   Superior escape none    LIFT-OFF  neg   X-Body ADD    pos    MOVING VALGUS neg        EXTREMITY NEURO-VASCULAR EXAM:    Sensation grossly intact to light touch all dermatomal regions.    DTR 2+ Biceps, Triceps, BR and Negative Martins sign   Grossly intact motor function at Elbow, Wrist and Hand   Distal pulses radial and ulnar 2+, brisk cap refill, symmetric.      NECK:  Painless FROM and spinous processes non-tender. Negative Spurlings sign.      OTHER FINDINGS:  + scapular dyskinesia    XRAYS:  Xrays including AP, Outlet and Axillary Lateral of Left shoulder are ordered / images reviewed by me:   No fracture dislocation or other pathology   Proximal migration of humeral head: None   GH arthritis: Mild    MRI right shoulder 5/9/18  1. MR findings compatible with adhesive capsulitis.  2. Mild teres minor and subscapularis nonspecific muscle edema.  3. Supraspinatus tendinosis with low-grade undersurface fraying.  4. Biceps tendinosis.  5. Subacromial subdeltoid bursitis.    ASSESSMENT:   Right shoulder pain, adhesive capsulitis    PLAN:      1. Continue celebrex  2. Start PT at [facility] (starting next week)  3. RTC in 2 months for follow up    All questions were answered, patient will contact us for questions or concerns in the interim.

## 2018-05-15 ENCOUNTER — HOSPITAL ENCOUNTER (OUTPATIENT)
Dept: RADIOLOGY | Facility: HOSPITAL | Age: 58
Discharge: HOME | End: 2018-05-15
Attending: PHYSICIAN ASSISTANT
Payer: MEDICAID

## 2018-05-15 DIAGNOSIS — N28.1 RENAL CYST, LEFT: ICD-10-CM

## 2018-05-15 PROCEDURE — 76770 US EXAM ABDO BACK WALL COMP: CPT | Mod: 26,,, | Performed by: RADIOLOGY

## 2018-05-15 PROCEDURE — 76770 US EXAM ABDO BACK WALL COMP: CPT | Mod: TC

## 2018-05-16 ENCOUNTER — TELEPHONE (OUTPATIENT)
Dept: UROLOGY | Facility: CLINIC | Age: 58
End: 2018-05-16

## 2018-05-16 NOTE — TELEPHONE ENCOUNTER
Patient notified of renal ultrasound results.    PSA scheduled.   He took flomax for a little while but then stopped due to him starting another medication for his shoulder.  He wasn't sure if he should take them together.   He reports decrease urgency and nocturia when he was taking it.    He plans to restart flomax.  He will follow back in clinic after 1 month.

## 2018-05-21 ENCOUNTER — LAB VISIT (OUTPATIENT)
Dept: LAB | Facility: HOSPITAL | Age: 58
End: 2018-05-21
Attending: PHYSICIAN ASSISTANT
Payer: MEDICAID

## 2018-05-21 DIAGNOSIS — R97.20 ELEVATED PSA: ICD-10-CM

## 2018-05-21 LAB — COMPLEXED PSA SERPL-MCNC: 5.5 NG/ML

## 2018-05-21 PROCEDURE — 36415 COLL VENOUS BLD VENIPUNCTURE: CPT

## 2018-05-21 PROCEDURE — 84153 ASSAY OF PSA TOTAL: CPT

## 2018-06-01 ENCOUNTER — DOCUMENTATION ONLY (OUTPATIENT)
Dept: REHABILITATION | Facility: HOSPITAL | Age: 58
End: 2018-06-01

## 2018-06-01 DIAGNOSIS — R53.1 DECREASED STRENGTH: ICD-10-CM

## 2018-06-01 DIAGNOSIS — M25.60 DECREASED MOBILITY OF JOINT: ICD-10-CM

## 2018-06-01 DIAGNOSIS — M25.511 ACUTE PAIN OF RIGHT SHOULDER: ICD-10-CM

## 2018-06-01 DIAGNOSIS — M25.611 DECREASED RANGE OF MOTION OF RIGHT SHOULDER: ICD-10-CM

## 2018-06-01 NOTE — PROGRESS NOTES
REHAB SERVICES OUTPATIENT DISCHARGE SUMMARY  Physical Therapy      Name:  Thaddeus Moss  Date:  6/1/2018    Date of Evaluation:  4/11/18  Physician:  AMPARO Medina  Total # Of Visits:  2   Diagnosis:    Encounter Diagnoses   Name Primary?    Acute pain of right shoulder     Decreased range of motion of right shoulder     Decreased mobility of joint     Decreased strength          Physical/Functional Status:  Unable to assess pt's functional limitations and current impairments due to pt not returning to the clinic.     The patient is to be discharged from our Therapy service for the following reason(s):  Patient has not attended therapy since 4/17/18.    Degree of Goal Achievement: Patient has not met goals secondary to:  Not returning to clinic for follow up assessment.    Patient Education: None provided    Discharge Plan:  D/C due to non-compliance for remaining visit.    Melissa Carrera, PT  6/1/2018

## 2018-10-13 ENCOUNTER — HOSPITAL ENCOUNTER (EMERGENCY)
Facility: HOSPITAL | Age: 58
Discharge: HOME | End: 2018-10-13
Attending: INTERNAL MEDICINE
Payer: MEDICAID

## 2018-10-13 VITALS
RESPIRATION RATE: 18 BRPM | HEIGHT: 70 IN | WEIGHT: 187 LBS | TEMPERATURE: 97 F | DIASTOLIC BLOOD PRESSURE: 100 MMHG | BODY MASS INDEX: 26.77 KG/M2 | SYSTOLIC BLOOD PRESSURE: 141 MMHG | HEART RATE: 91 BPM | OXYGEN SATURATION: 99 %

## 2018-10-13 DIAGNOSIS — M54.9 MID BACK PAIN: ICD-10-CM

## 2018-10-13 DIAGNOSIS — M54.50 LOW BACK PAIN: ICD-10-CM

## 2018-10-13 DIAGNOSIS — M79.605 LEFT LEG PAIN: Primary | ICD-10-CM

## 2018-10-13 DIAGNOSIS — S80.12XA CONTUSION OF LEFT LOWER EXTREMITY, INITIAL ENCOUNTER: ICD-10-CM

## 2018-10-13 DIAGNOSIS — I10 HYPERTENSION, UNSPECIFIED TYPE: ICD-10-CM

## 2018-10-13 DIAGNOSIS — M25.511 RIGHT SHOULDER PAIN: ICD-10-CM

## 2018-10-13 PROBLEM — R03.0 ELEVATED BLOOD PRESSURE READING: Status: ACTIVE | Noted: 2018-10-13

## 2018-10-13 PROCEDURE — 25000003 PHARM REV CODE 250: Performed by: INTERNAL MEDICINE

## 2018-10-13 PROCEDURE — 99284 EMERGENCY DEPT VISIT MOD MDM: CPT | Mod: 25

## 2018-10-13 RX ORDER — IBUPROFEN 800 MG/1
800 TABLET ORAL EVERY 8 HOURS PRN
Qty: 30 TABLET | Refills: 0 | OUTPATIENT
Start: 2018-10-13 | End: 2019-03-21

## 2018-10-13 RX ORDER — IBUPROFEN 400 MG/1
800 TABLET ORAL
Status: COMPLETED | OUTPATIENT
Start: 2018-10-13 | End: 2018-10-13

## 2018-10-13 RX ADMIN — IBUPROFEN 800 MG: 400 TABLET ORAL at 09:10

## 2018-10-14 NOTE — ED PROVIDER NOTES
Encounter Date: 10/13/2018       History     Chief Complaint   Patient presents with    fall with left leg pain     pt reports fell into drain approx 4pm now with left leg pain, pain 10 of 10 , also c/o right shoulder and lower back pain, suffers with chronic shoulder and back pain , pain worse after fall.      58 y.o male complains of left leg pain after falling in a drain at 4:00pm today. He also has associated right shoulder and back pain. 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 He states his pain as worsened due to the fall. His shoulder pain is exacerbated by movement. He denies LOC or head injury. Tetanus not UTD.      The history is provided by the patient.     Review of patient's allergies indicates:  No Known Allergies  Past Medical History:   Diagnosis Date    Paget disease of bone      History reviewed. No pertinent surgical history.  Family History   Problem Relation Age of Onset    Diabetes Mother     Diabetes Father     Hypertension Sister     Hypertension Brother     Hypertension Sister      Social History     Tobacco Use    Smoking status: Former Smoker    Smokeless tobacco: Never Used   Substance Use Topics    Alcohol use: Yes    Drug use: No     Review of Systems   Musculoskeletal: Positive for arthralgias (right shoulder pain) and back pain. Negative for joint swelling and neck pain.        Left leg pain   Neurological: Negative for syncope.   All other systems reviewed and are negative.      Physical Exam     Initial Vitals [10/13/18 2037]   BP Pulse Resp Temp SpO2   (!) 168/120 97 18 97.1 °F (36.2 °C) 100 %      MAP       --         Physical Exam    Nursing note and vitals reviewed.  Constitutional: He appears well-developed and well-nourished. He is not diaphoretic. No distress.   HENT:   Head: Normocephalic and atraumatic.   Right Ear: External ear normal.   Left Ear: External ear normal.   Eyes: Conjunctivae and EOM are normal.   Neck: Normal range of  motion. Neck supple.   Cardiovascular: Normal rate, regular rhythm, normal heart sounds and intact distal pulses. Exam reveals no gallop and no friction rub.    No murmur heard.  Pulmonary/Chest: Breath sounds normal. No respiratory distress. He has no wheezes. He has no rhonchi. He has no rales.   Abdominal: Soft. Bowel sounds are normal. There is no tenderness.   Musculoskeletal:        Right shoulder: He exhibits decreased range of motion and pain. He exhibits no tenderness, no bony tenderness, no swelling, no effusion, no deformity and no laceration.        Thoracic back: He exhibits pain. He exhibits no tenderness, no bony tenderness, no swelling, no edema, no deformity and no laceration.   Right shoulder pain upon movement without gross deformity.  Right upper extremity is neurovascularly intact. Thoracic and lumbar paraspinal muscle pain upon movement without gross deformity or point tenderness.   Neurological: He is alert and oriented to person, place, and time. He has normal strength. No cranial nerve deficit.   Skin: Skin is warm and dry. Capillary refill takes less than 2 seconds. Abrasion noted.        Left anterior leg abrasion   Psychiatric: He has a normal mood and affect. His behavior is normal. Thought content normal.         ED Course   Procedures  Labs Reviewed - No data to display       Imaging Results          X-Ray Lumbar Spine Ap And Lateral (Final result)  Result time 10/13/18 21:38:54    Final result by Faisal Allred MD (10/13/18 21:38:54)                 Impression:      No acute displaced fracture-dislocation identified.    Stable chronic findings most suggestive of Paget's disease of the L2 vertebral body.      Electronically signed by: Faisal Allred MD  Date:    10/13/2018  Time:    21:38             Narrative:    EXAMINATION:  XR THORACIC SPINE AP LATERAL; XR LUMBAR SPINE AP AND LATERAL    CLINICAL HISTORY:  T/L-spine trauma, minor-mod, low back pain;  Dorsalgia, unspecified; Low back  pain    TECHNIQUE:  AP and lateral views of the thoracic spine and AP and lateral views of the lumbar spine    COMPARISON:  MRI lumbar spine 04/18/2018    FINDINGS:  Thoracic spine: Overall alignment is within normal limits.  Thoracic vertebral body heights are maintained.  No displaced fracture, dislocation or significant listhesis.  Included surrounding soft tissues are within normal limits.    Lumbar spine: Overall alignment is within normal limits.  No displaced fracture, dislocation or significant listhesis.  There is diffuse sclerosis with cortical thickening and trabeculation of L2 vertebral body extending to the posterior elements, grossly similar appearance to previous MRI exams, most suggestive of Paget's disease.  Overall vertebral body heights are unchanged.  Mild loss of disc height and mild facet arthrosis at L4-5.  Loss of disc height with mild endplate changes and facet arthrosis at L5-S1.  Included surrounding soft tissues are within normal limits.                               X-Ray Thoracic Spine AP Lateral (Final result)  Result time 10/13/18 21:38:54    Final result by Faisal Allred MD (10/13/18 21:38:54)                 Impression:      No acute displaced fracture-dislocation identified.    Stable chronic findings most suggestive of Paget's disease of the L2 vertebral body.      Electronically signed by: Faisal Allred MD  Date:    10/13/2018  Time:    21:38             Narrative:    EXAMINATION:  XR THORACIC SPINE AP LATERAL; XR LUMBAR SPINE AP AND LATERAL    CLINICAL HISTORY:  T/L-spine trauma, minor-mod, low back pain;  Dorsalgia, unspecified; Low back pain    TECHNIQUE:  AP and lateral views of the thoracic spine and AP and lateral views of the lumbar spine    COMPARISON:  MRI lumbar spine 04/18/2018    FINDINGS:  Thoracic spine: Overall alignment is within normal limits.  Thoracic vertebral body heights are maintained.  No displaced fracture, dislocation or significant listhesis.  Included  surrounding soft tissues are within normal limits.    Lumbar spine: Overall alignment is within normal limits.  No displaced fracture, dislocation or significant listhesis.  There is diffuse sclerosis with cortical thickening and trabeculation of L2 vertebral body extending to the posterior elements, grossly similar appearance to previous MRI exams, most suggestive of Paget's disease.  Overall vertebral body heights are unchanged.  Mild loss of disc height and mild facet arthrosis at L4-5.  Loss of disc height with mild endplate changes and facet arthrosis at L5-S1.  Included surrounding soft tissues are within normal limits.                               X-Ray Tibia Fibula 2 View Left (Final result)  Result time 10/13/18 21:22:14    Final result by Alyssa Ramirez MD (10/13/18 21:22:14)                 Impression:      No acute osseous abnormality identified.      Electronically signed by: Alyssa Ramirez MD  Date:    10/13/2018  Time:    21:22             Narrative:    EXAMINATION:  XR TIBIA FIBULA 2 VIEW LEFT    CLINICAL HISTORY:  Pain in left leg    TECHNIQUE:  AP and lateral views of the left tibia and fibula were performed.    COMPARISON:  None.    FINDINGS:  No evidence of fracture, dislocation, or osseous destructive process.  Medial and lateral knee compartment joint spaces are preserved.  No abnormal widening of the ankle mortise.                               X-Ray Shoulder Complete 2 View Right (Final result)  Result time 10/13/18 21:20:53    Final result by Alyssa Ramirez MD (10/13/18 21:20:53)                 Impression:      No acute osseous abnormality identified.      Electronically signed by: Alyssa Ramirez MD  Date:    10/13/2018  Time:    21:20             Narrative:    EXAMINATION:  XR SHOULDER COMPLETE 2 OR MORE VIEWS RIGHT    CLINICAL HISTORY:  Pain in right shoulder    TECHNIQUE:  Three views of the right shoulder were performed.    COMPARISON:  05/02/2018.    FINDINGS:  No evidence of  acute fracture, dislocation, or osseous destructive process.  Minimal degenerative changes are seen at the right shoulder.  Visualized right lung is clear.                                 Medical Decision Making:   Initial Assessment:   58 y.o male complains of left leg pain after falling in a drain at 4:00pm today. He also has associated right shoulder and back pain. He says he has chronic right shoulder and back pain due to paget disease. He states his pain as worsened due to the fall. His shoulder pain is exacerbated by movement. He denies LOC or head injury. Tetanus not UTD.  Clinical Tests:   Radiological Study: Ordered and Reviewed  ED Management:  X-rays of left leg, right shoulder, thoracic and lumbar spine revealed no acute changes.  Repeat blood pressure was improved, but remains elevated.  Patient was given instructions for contusion of left leg/muscle strain/elevated blood pressure and given a prescription for ibuprofen.  First dose was given in the emergency department.  He was advised to follow up with his primary care physician within the next 2 days for re-evaluation and to return to the emergency department if condition worsens.            Scribe Attestation:   Scribe #1: I performed the above scribed service and the documentation accurately describes the services I performed. I attest to the accuracy of the note.    This document was produced by a scribe under my direction and in my presence. I agree with the content of the note and have made any necessary edits.     Dr. Mejia    10/13/2018 9:44 PM             Clinical Impression:     1. Left leg pain    2. Right shoulder pain    3. Low back pain    4. Mid back pain    5. Contusion of left lower extremity, initial encounter    6. Hypertension, unspecified type        Disposition:   Disposition: Discharged  Condition: Stable                        Shaggy Mejia MD  10/13/18 [id number]

## 2018-10-14 NOTE — ED TRIAGE NOTES
Pt arrived to ED s/p fall in a hole today. Pt c/o right shoulder and back pain, left lower leg pain with abrasion, and toes tingling. Pt ambulated in ER and to room without difficulty. Resp even and non labored. NAD noted. EDP to evaluate. Will continue to monitor.

## 2018-10-17 ENCOUNTER — OFFICE VISIT (OUTPATIENT)
Dept: URGENT CARE | Facility: CLINIC | Age: 58
End: 2018-10-17
Payer: MEDICAID

## 2018-10-17 VITALS
OXYGEN SATURATION: 99 % | DIASTOLIC BLOOD PRESSURE: 80 MMHG | TEMPERATURE: 98 F | BODY MASS INDEX: 26.92 KG/M2 | SYSTOLIC BLOOD PRESSURE: 122 MMHG | WEIGHT: 188 LBS | HEART RATE: 70 BPM | HEIGHT: 70 IN

## 2018-10-17 DIAGNOSIS — T14.8XXA ABRASION: Primary | ICD-10-CM

## 2018-10-17 DIAGNOSIS — M25.511 RIGHT SHOULDER PAIN, UNSPECIFIED CHRONICITY: ICD-10-CM

## 2018-10-17 DIAGNOSIS — M25.473 ANKLE EDEMA: ICD-10-CM

## 2018-10-17 PROCEDURE — 99214 OFFICE O/P EST MOD 30 MIN: CPT | Mod: S$GLB,,, | Performed by: NURSE PRACTITIONER

## 2018-10-17 RX ORDER — ETODOLAC 400 MG/1
400 TABLET, FILM COATED ORAL 2 TIMES DAILY
Qty: 30 TABLET | Refills: 0 | Status: SHIPPED | OUTPATIENT
Start: 2018-10-17 | End: 2018-10-17 | Stop reason: SDUPTHER

## 2018-10-17 RX ORDER — ETODOLAC 400 MG/1
400 TABLET, FILM COATED ORAL 2 TIMES DAILY
Qty: 30 TABLET | Refills: 0 | Status: SHIPPED | OUTPATIENT
Start: 2018-10-17 | End: 2019-03-21

## 2018-10-17 RX ORDER — MUPIROCIN 20 MG/G
OINTMENT TOPICAL
Qty: 22 G | Refills: 0 | Status: SHIPPED | OUTPATIENT
Start: 2018-10-17 | End: 2018-10-17 | Stop reason: SDUPTHER

## 2018-10-17 RX ORDER — MUPIROCIN 20 MG/G
OINTMENT TOPICAL
Qty: 22 G | Refills: 0 | Status: SHIPPED | OUTPATIENT
Start: 2018-10-17 | End: 2019-03-21

## 2018-10-17 NOTE — PROGRESS NOTES
"Subjective:       Patient ID: Thaddeus Moss is a 58 y.o. male.    Vitals:  height is 5' 10" (1.778 m) and weight is 85.3 kg (188 lb). His temperature is 98.1 °F (36.7 °C). His blood pressure is 122/80 and his pulse is 70. His oxygen saturation is 99%.     Chief Complaint: Shoulder Pain (left shoulder ); Leg Pain; and Back Pain    Pt reports he was seen in [facility] ED on 4 days ago, after falling into meter cover drain,  He reports having pain still in his left shoulder and right leg abrasion and lower back pain from the fall, pt requesting a referral to ortho       Back Pain   This is a recurrent problem. Radiates to: right leg abrasion  The pain is at a severity of 4/10. Pertinent negatives include no abdominal pain, chest pain, fever or headaches.     Review of Systems   Constitution: Negative for chills and fever.   HENT: Negative for sore throat.    Eyes: Negative for blurred vision.   Cardiovascular: Negative for chest pain.   Respiratory: Negative for shortness of breath.    Skin: Negative for rash.   Musculoskeletal: Positive for back pain. Negative for joint pain.        Leg pain     Gastrointestinal: Negative for abdominal pain, diarrhea, nausea and vomiting.   Neurological: Negative for headaches.   Psychiatric/Behavioral: The patient is not nervous/anxious.        Objective:      Physical Exam   Constitutional: He is oriented to person, place, and time. He appears well-developed and well-nourished. He is cooperative.  Non-toxic appearance. He does not appear ill. No distress.   HENT:   Head: Normocephalic and atraumatic.   Right Ear: Hearing, tympanic membrane and ear canal normal.   Left Ear: Hearing, tympanic membrane and ear canal normal.   Nose: No mucosal edema, rhinorrhea or nasal deformity. No epistaxis. Right sinus exhibits no maxillary sinus tenderness and no frontal sinus tenderness. Left sinus exhibits no maxillary sinus tenderness and no frontal sinus tenderness.   Mouth/Throat: Uvula is " midline and mucous membranes are normal. No trismus in the jaw. Normal dentition. No uvula swelling. No posterior oropharyngeal erythema.   Eyes: Conjunctivae and lids are normal. Right eye exhibits no discharge. Left eye exhibits no discharge. No scleral icterus.   Sclera clear bilat   Neck: Trachea normal, normal range of motion, full passive range of motion without pain and phonation normal. Neck supple.   Cardiovascular: Normal rate, regular rhythm and normal pulses.   Pulmonary/Chest: Effort normal and breath sounds normal. No respiratory distress.   Abdominal: Soft. Normal appearance and bowel sounds are normal. He exhibits no distension, no pulsatile midline mass and no mass. There is no tenderness.   Musculoskeletal: He exhibits edema and tenderness. He exhibits no deformity.        Right shoulder: He exhibits decreased range of motion.        Left ankle: He exhibits swelling.        Arms:       Legs:       Feet:    Neurological: He is alert and oriented to person, place, and time. He exhibits normal muscle tone. Coordination normal.   Skin: Skin is warm, dry and intact. He is not diaphoretic. No pallor.   Psychiatric: He has a normal mood and affect. His speech is normal and behavior is normal. Judgment and thought content normal. Cognition and memory are normal.   Nursing note and vitals reviewed.      Cleaned left lower leg & applied bactroban & ace wrap. Also applied ace wrap to left ankle  Pt requesting a referral to orthopedics for his right shoulder (states was told has frozen shoulder) and lower back. States bc of his Paget's disease he aggravated these areas which he has had problems with in the past and would like to follow up with orthopedics. No new injuries noted today per assessment. Pt states he is not up to date on his tetanus shot but prefers not to have one at this time.  Assessment:       1. Abrasion    2. Ankle edema    3. Right shoulder pain, unspecified chronicity        Plan:        Patient Instructions       Treating Ankle Sprains  Treatment will depend on how bad your sprain is. For a severe sprain, healing may take 3 months or more.  Right after your injury: Use R.I.C.E.  · Rest: At first, keep weight off the ankle as much as you can. You may be given crutches to help you walk without putting weight on the ankle.  · Ice: Put an ice pack on the ankle for 15 minutes. Remove the pack and wait at least 30 minutes. Repeat for up to 3 days. This helps reduce swelling.  · Compression: To reduce swelling and keep the joint stable, you may need to wrap the ankle with an elastic bandage. For more severe sprains, you may need an ankle brace or a cast.  · Elevation: To reduce swelling, keep your ankle raised above your heart when you sit or lie down.  Medicine  Your healthcare provider may suggest oral non-steroidal anti-inflammatory medicine (NSAIDs), such as ibuprofen. This relieves the pain and helps reduce any swelling. Be sure to take your medicine as directed.  Contrast baths  After 3 days, soak your ankle in warm water for 30 seconds, then in cool water for 30 seconds. Go back and forth for 5 minutes. Doing this every 2 hours will help keep the swelling down.  Exercises    After about 2 to 3 weeks, you may be given exercises to strengthen the ligaments and muscles in the ankle. Doing these exercises will help prevent another ankle sprain. Exercises may include standing on your toes and then on your heels and doing ankle curls.  Ankle curls  · Sit on the edge of a sturdy table or lie on your back.  · Pull your toes toward you. Then point them away from you. Repeat for 2 to 3 minutes.   Date Last Reviewed: 9/28/2015  © 4040-2526 [employer]. [address] [place] [address], [place] [address]. All rights reserved. This information is not intended as a substitute for professional medical care. Always follow your healthcare professional's instructions.        Wound Care  Taking proper care of  your wound will help it heal. Your healthcare provider may show you how to clean and dress the wound. He or she will also explain how to tell if the wound is healing normally. If you are unsure of how to take care of the wound, be sure to clarify what dressing to use and how often you should change the bandages. Here are the basic steps.     A wound that's not healing normally may be dark in color or have white streaks.   Wash your hands  Tips for washing your hands include:  · Use liquid soap and lather for 2 minutes. Scrub between your fingers and under your nails.  · Rinse with warm water, keeping your fingers pointing down.  · Use a paper towel to dry your hands and to turn off the faucet.  Remove the used dressing  Here are suggestions for removing the dressing:  · If dressing changes cause you pain, be sure to take your pain medicine as prescribed by your healthcare provider 30 minutes before dressing changes.  · Set up your supplies.  · Put on disposable gloves if youre dressing a wound for someone else or your wound is infected.  · Loosen the tape by pulling gently toward the wound.  · Gently take off the old dressing. If the dressing is stuck to the wound, moisten it with saline (if available) or clean water.  · If you have a drain or tube in the wound, be careful not to pull on it.  · Remove the dressing 1 layer at a time and put it in a plastic bag.  · Remove your gloves.  Inspect and dress the wound  Check the wound carefully:  · Each time you change the dressing, inspect the wound carefully to be sure its healing normally by making sure your wound appears to be pink and moist, and is free of infection.    · Wash your hands again. Put on a new pair of gloves.  · Clean and dress the wound as directed by your healthcare provider or nurse. Do not put anything in the wound that is not prescribed or directed by your healthcare provider. If you have a drain or tube, be careful not to pull on it. Make sure to  secure the drain or tube as well.  · Put all supplies in a plastic bag. Seal the bag and put it in the trash.  · Be sure to wash your hands again.  Call your healthcare provider  Call your healthcare provider if you see any of the following signs of a problem:  · Bleeding that soaks the dressing  · Pink fluid weeping from the wound  · Increased drainage or drainage that is yellow, yellow-green, or foul-smelling  · Increased swelling or pain, or redness or swelling in the skin around the wound  · A change in the color of the wound, or if streaks develop in a direction away from the wound  · The area between any stitches opens up  · An increase in the size of the wound  · A fever of 100.4°F (38°C) or higher, or as directed by your healthcare provider  · Chills, increased fatigue, or a loss of appetite      Date Last Reviewed: 7/30/2015  © 8810-7120 [employer]. [address] [place] [address]. All rights reserved. This information is not intended as a substitute for professional medical care. Always follow your healthcare professional's instructions.      Understanding Frozen Shoulder    Frozen shoulder is a condition where the shoulder becomes painful and hard to move. The condition is sometimes called adhesive capsulitis.  The shoulder is a joint that is made up of many parts. These parts allow you to raise, rotate, and swing your arm. The parts of a normal shoulder are:  Humeral head. The ball at the top of the upper arm bone (humerus).  Scapula. The shoulder blade.  Glenoid. The shallow socket on the scapula. (The humeral head rests on the glenoid.)  Capsule. A sheet of tough tissue that encloses the joint and joins the ball to the socket.  With frozen shoulder, the capsule thickens, and shrinks and pulls in (contracts). It is not clear why this happens. It may be from swelling and irritation, or from scar tissue forming. Over time, this may result in pain, stiffness, and loss of movement  in the shoulder.  What causes frozen shoulder?  Experts dont know for sure why frozen shoulder occurs. Some things can make the condition more likely. These include:  Being a woman  Being 40 to 60 years old  Having certain health conditions, such as diabetes or thyroid disease  Taking certain medicines  Not using the shoulder for a prolonged period of time, such as after an injury or surgery  Symptoms of frozen shoulder  Frozen shoulder typically occurs in 3 stages. Each stage will vary, but often lasts a few months or longer:  Freezing stage. The shoulder is very painful. Pain often gets worse when moving your arm and at night during sleep. The shoulder gradually becomes stiffer.  Frozen stage. The shoulder is very stiff and hard to move. Pain may be less than in the first stage. It may be hard to do daily tasks, such as dressing or bathing.  Thawing stage. Pain and stiffness slowly get better. In time, normal or almost normal use of the shoulder returns.  Treatment for frozen shoulder  Most cases of frozen shoulder get better, even with no treatment. Treatment is done to help speed healing and help regain as much joint movement as possible. The best course of treatment will depend on your needs. It may include one or more of the following:  Prescription or over-the-counter pain medicines. These help relieve pain and reduce swelling.  Stretching exercises. These help restore movement to the shoulder. You may do them on your own or under the care of a physical therapist.  Cortisone shots. These are given into your shoulder joint. They cant cure frozen shoulder. But they can help give short-term relief from symptoms and allow you to do exercises without too much pain.  Cold packs and heat packs. These can help relieve symptoms.  Keep in mind that getting better is a slow process. It can take a year or longer. If your shoulder doesnt get better on its own in this time, you may need surgery. This is done to loosen  the tight tissues in the shoulder joint.  When to call your healthcare provider  Call your healthcare provider right away if you have any of these:  Fever of 100.4°F (38°C) or higher, or as directed  Symptoms that dont get better, or get worse  New symptoms   Date Last Reviewed: 3/10/2016  © 9402-7626 [employer]. [address] [place] [address]. All rights reserved. This information is not intended as a substitute for professional medical care. Always follow your healthcare professional's instructions.              Abrasion    Ankle edema    Right shoulder pain, unspecified chronicity  -     Ambulatory referral to Orthopedics    Other orders  -     Discontinue: mupirocin (BACTROBAN) 2 % ointment; Apply to affected area 3 times daily  Dispense: 22 g; Refill: 0  -     Discontinue: etodolac (LODINE) 400 MG tablet; Take 1 tablet (400 mg total) by mouth 2 (two) times daily.  Dispense: 30 tablet; Refill: 0  -     etodolac (LODINE) 400 MG tablet; Take 1 tablet (400 mg total) by mouth 2 (two) times daily.  Dispense: 30 tablet; Refill: 0  -     mupirocin (BACTROBAN) 2 % ointment; Apply to affected area 3 times daily  Dispense: 22 g; Refill: 0

## 2018-10-17 NOTE — PATIENT INSTRUCTIONS
Treating Ankle Sprains  Treatment will depend on how bad your sprain is. For a severe sprain, healing may take 3 months or more.  Right after your injury: Use R.I.C.E.  · Rest: At first, keep weight off the ankle as much as you can. You may be given crutches to help you walk without putting weight on the ankle.  · Ice: Put an ice pack on the ankle for 15 minutes. Remove the pack and wait at least 30 minutes. Repeat for up to 3 days. This helps reduce swelling.  · Compression: To reduce swelling and keep the joint stable, you may need to wrap the ankle with an elastic bandage. For more severe sprains, you may need an ankle brace or a cast.  · Elevation: To reduce swelling, keep your ankle raised above your heart when you sit or lie down.  Medicine  Your healthcare provider may suggest oral non-steroidal anti-inflammatory medicine (NSAIDs), such as ibuprofen. This relieves the pain and helps reduce any swelling. Be sure to take your medicine as directed.  Contrast baths  After 3 days, soak your ankle in warm water for 30 seconds, then in cool water for 30 seconds. Go back and forth for 5 minutes. Doing this every 2 hours will help keep the swelling down.  Exercises    After about 2 to 3 weeks, you may be given exercises to strengthen the ligaments and muscles in the ankle. Doing these exercises will help prevent another ankle sprain. Exercises may include standing on your toes and then on your heels and doing ankle curls.  Ankle curls  · Sit on the edge of a sturdy table or lie on your back.  · Pull your toes toward you. Then point them away from you. Repeat for 2 to 3 minutes.   Date Last Reviewed: 9/28/2015  © 4363-1820 [employer]. [address] [place] [address], [place] [address]. All rights reserved. This information is not intended as a substitute for professional medical care. Always follow your healthcare professional's instructions.        Wound Care  Taking proper care of your wound will help it  heal. Your healthcare provider may show you how to clean and dress the wound. He or she will also explain how to tell if the wound is healing normally. If you are unsure of how to take care of the wound, be sure to clarify what dressing to use and how often you should change the bandages. Here are the basic steps.     A wound that's not healing normally may be dark in color or have white streaks.   Wash your hands  Tips for washing your hands include:  · Use liquid soap and lather for 2 minutes. Scrub between your fingers and under your nails.  · Rinse with warm water, keeping your fingers pointing down.  · Use a paper towel to dry your hands and to turn off the faucet.  Remove the used dressing  Here are suggestions for removing the dressing:  · If dressing changes cause you pain, be sure to take your pain medicine as prescribed by your healthcare provider 30 minutes before dressing changes.  · Set up your supplies.  · Put on disposable gloves if youre dressing a wound for someone else or your wound is infected.  · Loosen the tape by pulling gently toward the wound.  · Gently take off the old dressing. If the dressing is stuck to the wound, moisten it with saline (if available) or clean water.  · If you have a drain or tube in the wound, be careful not to pull on it.  · Remove the dressing 1 layer at a time and put it in a plastic bag.  · Remove your gloves.  Inspect and dress the wound  Check the wound carefully:  · Each time you change the dressing, inspect the wound carefully to be sure its healing normally by making sure your wound appears to be pink and moist, and is free of infection.    · Wash your hands again. Put on a new pair of gloves.  · Clean and dress the wound as directed by your healthcare provider or nurse. Do not put anything in the wound that is not prescribed or directed by your healthcare provider. If you have a drain or tube, be careful not to pull on it. Make sure to secure the drain or  tube as well.  · Put all supplies in a plastic bag. Seal the bag and put it in the trash.  · Be sure to wash your hands again.  Call your healthcare provider  Call your healthcare provider if you see any of the following signs of a problem:  · Bleeding that soaks the dressing  · Pink fluid weeping from the wound  · Increased drainage or drainage that is yellow, yellow-green, or foul-smelling  · Increased swelling or pain, or redness or swelling in the skin around the wound  · A change in the color of the wound, or if streaks develop in a direction away from the wound  · The area between any stitches opens up  · An increase in the size of the wound  · A fever of 100.4°F (38°C) or higher, or as directed by your healthcare provider  · Chills, increased fatigue, or a loss of appetite      Date Last Reviewed: 7/30/2015  © 6331-3582 [employer]. [address] [place] [address]. All rights reserved. This information is not intended as a substitute for professional medical care. Always follow your healthcare professional's instructions.      Understanding Frozen Shoulder    Frozen shoulder is a condition where the shoulder becomes painful and hard to move. The condition is sometimes called adhesive capsulitis.  The shoulder is a joint that is made up of many parts. These parts allow you to raise, rotate, and swing your arm. The parts of a normal shoulder are:  Humeral head. The ball at the top of the upper arm bone (humerus).  Scapula. The shoulder blade.  Glenoid. The shallow socket on the scapula. (The humeral head rests on the glenoid.)  Capsule. A sheet of tough tissue that encloses the joint and joins the ball to the socket.  With frozen shoulder, the capsule thickens, and shrinks and pulls in (contracts). It is not clear why this happens. It may be from swelling and irritation, or from scar tissue forming. Over time, this may result in pain, stiffness, and loss of movement in the  shoulder.  What causes frozen shoulder?  Experts dont know for sure why frozen shoulder occurs. Some things can make the condition more likely. These include:  Being a woman  Being 40 to 60 years old  Having certain health conditions, such as diabetes or thyroid disease  Taking certain medicines  Not using the shoulder for a prolonged period of time, such as after an injury or surgery  Symptoms of frozen shoulder  Frozen shoulder typically occurs in 3 stages. Each stage will vary, but often lasts a few months or longer:  Freezing stage. The shoulder is very painful. Pain often gets worse when moving your arm and at night during sleep. The shoulder gradually becomes stiffer.  Frozen stage. The shoulder is very stiff and hard to move. Pain may be less than in the first stage. It may be hard to do daily tasks, such as dressing or bathing.  Thawing stage. Pain and stiffness slowly get better. In time, normal or almost normal use of the shoulder returns.  Treatment for frozen shoulder  Most cases of frozen shoulder get better, even with no treatment. Treatment is done to help speed healing and help regain as much joint movement as possible. The best course of treatment will depend on your needs. It may include one or more of the following:  Prescription or over-the-counter pain medicines. These help relieve pain and reduce swelling.  Stretching exercises. These help restore movement to the shoulder. You may do them on your own or under the care of a physical therapist.  Cortisone shots. These are given into your shoulder joint. They cant cure frozen shoulder. But they can help give short-term relief from symptoms and allow you to do exercises without too much pain.  Cold packs and heat packs. These can help relieve symptoms.  Keep in mind that getting better is a slow process. It can take a year or longer. If your shoulder doesnt get better on its own in this time, you may need surgery. This is done to loosen the  tight tissues in the shoulder joint.  When to call your healthcare provider  Call your healthcare provider right away if you have any of these:  Fever of 100.4°F (38°C) or higher, or as directed  Symptoms that dont get better, or get worse  New symptoms   Date Last Reviewed: 3/10/2016  © 7664-1892 [employer]. [address] [place] [address], [place] [address]. All rights reserved. This information is not intended as a substitute for professional medical care. Always follow your healthcare professional's instructions.

## 2018-10-30 ENCOUNTER — OFFICE VISIT (OUTPATIENT)
Dept: URGENT CARE | Facility: CLINIC | Age: 58
End: 2018-10-30
Payer: MEDICAID

## 2018-10-30 VITALS
TEMPERATURE: 98 F | OXYGEN SATURATION: 98 % | WEIGHT: 188 LBS | DIASTOLIC BLOOD PRESSURE: 104 MMHG | HEART RATE: 89 BPM | HEIGHT: 70 IN | RESPIRATION RATE: 18 BRPM | BODY MASS INDEX: 26.92 KG/M2 | SYSTOLIC BLOOD PRESSURE: 158 MMHG

## 2018-10-30 DIAGNOSIS — Z51.89 ENCOUNTER FOR WOUND CARE: Primary | ICD-10-CM

## 2018-10-30 DIAGNOSIS — G89.29 CHRONIC RIGHT SHOULDER PAIN: ICD-10-CM

## 2018-10-30 DIAGNOSIS — G89.29 CHRONIC LOW BACK PAIN WITHOUT SCIATICA, UNSPECIFIED BACK PAIN LATERALITY: ICD-10-CM

## 2018-10-30 DIAGNOSIS — M25.511 CHRONIC RIGHT SHOULDER PAIN: ICD-10-CM

## 2018-10-30 DIAGNOSIS — M54.50 CHRONIC LOW BACK PAIN WITHOUT SCIATICA, UNSPECIFIED BACK PAIN LATERALITY: ICD-10-CM

## 2018-10-30 PROCEDURE — 99214 OFFICE O/P EST MOD 30 MIN: CPT | Mod: S$GLB,,, | Performed by: NURSE PRACTITIONER

## 2018-10-30 NOTE — PATIENT INSTRUCTIONS
Wound Check, Infection  You have a wound that has become infected. The wound will not heal properly unless the infection is cleared. Infection in a wound may also spread if it is not treated. In most cases, antibiotic medicines are prescribed to treat a wound infection.   Symptoms of a wound infection include:  · Redness or swelling around the wound  · Warmth coming from the wound  · New or worsening pain  · Red streaks around the wound  · Draining pus  · Fever  Home care  Follow all directions you are given to treat the infection.  Medicines  Take all medicines as prescribed.   · If you were given antibiotics, take them until they are gone or your healthcare provider tells you to stop. It is vital to finish the antibiotics even if you feel better. If you do not finish them, the infection may come back and be harder to treat.  · If your infection is not responding to the medicines you are taking, you may be prescribed new medicines.  · Take medicine for pain as directed by your healthcare provider.  Wound care  Care for your wound as directed by your healthcare provider.  · Apply a warm compress (clean cloth soaked in hot water) to the infected area for about 5 to 10 minutes at a time. Be very careful not to burn yourself. Test the cloth on a non-infected area to make sure it is not too hot.  · Continue to change the dressing daily. If it becomes wet, stained with wound fluid, or dirty, change it sooner. To change it:  ¨ Wash your hands with soap and water before changing the dressing.  ¨ Carefully remove the dressing and tape. If it sticks to the wound, you may need to wet it a little to remove it. (Do not do this if your healthcare provider has told you not to.)  ¨ Gently clean the wound with clean water (or saline) using gauze, a clean washcloth, or cotton swab.  ¨ Do not use soap, alcohol, peroxide or other cleansers.  ¨ If you were told to dry the wound before putting on a new dressing, gently pat. Do not  rub.  ¨ Throw out the old dressing.  ¨ Wash your hands again before opening the new, clean dressing.  ¨ Wash your hands again when you are done.  Follow-up care  Follow up with your healthcare provider as advised. If a culture was done, you will be notified if your treatment needs to change. Call as directed for the results.  When to seek medical advice  Call your health care provider right away if any of these occur:  · Symptoms of infection don't start to improve within 2 days of starting antibiotics  · Symptoms of infection get worse  · New symptoms, such as red streaks around the wound  · Fever of 100.4°F (38.0°C) or higher for more than 2 days after starting the antibiotics  Date Last Reviewed: 8/10/2015  © 3124-3901 [employer], "[employer] [address] [place] [address], [place] [address]. All rights reserved. This information is not intended as a substitute for professional medical care. Always follow your healthcare professional's instructions.

## 2018-10-30 NOTE — PROCEDURES
Procedures     Patient presented to the clinic to have his wound cleaned and reassessed.  Wound is healing well with clear drainage present no erythema or streaking appreciated.  No tenderness to palpation noted.  Advised patient to keep applying Bactroban.  Bactroban and Band-Aid applied.

## 2018-10-30 NOTE — PROGRESS NOTES
"Subjective:       Patient ID: Thaddeus Moss is a 58 y.o. male.    Vitals:  height is 5' 10" (1.778 m) and weight is 85.3 kg (188 lb). His temperature is 97.8 °F (36.6 °C). His blood pressure is 158/104 (abnormal) and his pulse is 89. His respiration is 18 and oxygen saturation is 98%.     Chief Complaint: Wound Check    Patient presents to the clinic to have a wound looked that.  States that it is draining some clear fluid.  Patient was seen on 10/17/2018 and treated for the abrasion.  Also stated he has been unable to get in with Orthopedics at [facility] for his shoulder and his back.      Wound Check   He was originally treated more than 14 days ago. Previous treatment included wound cleansing or irrigation (bactroban). The maximum temperature noted was less than 100.4 F. The temperature was taken using an oral thermometer. There has been clear discharge from the wound. The redness has improved. The swelling has improved. The pain has improved. He has no difficulty moving the affected extremity or digit.     Review of Systems   Constitution: Negative for weakness and malaise/fatigue.   HENT: Negative for nosebleeds.    Cardiovascular: Negative for chest pain and syncope.   Respiratory: Negative for shortness of breath.    Musculoskeletal: Positive for back pain and joint pain. Negative for neck pain.   Gastrointestinal: Negative for abdominal pain.   Genitourinary: Negative for hematuria.   Neurological: Negative for dizziness and numbness.   All other systems reviewed and are negative.      Objective:      Physical Exam   Constitutional: He is oriented to person, place, and time. He appears well-developed and well-nourished. He is cooperative.  Non-toxic appearance. He does not appear ill. No distress.   HENT:   Head: Normocephalic and atraumatic.   Right Ear: Hearing, tympanic membrane and ear canal normal.   Left Ear: Hearing, tympanic membrane and ear canal normal.   Nose: No mucosal edema, rhinorrhea or nasal " deformity. No epistaxis. Right sinus exhibits no maxillary sinus tenderness and no frontal sinus tenderness. Left sinus exhibits no maxillary sinus tenderness and no frontal sinus tenderness.   Mouth/Throat: Uvula is midline and mucous membranes are normal. No trismus in the jaw. Normal dentition. No uvula swelling. No posterior oropharyngeal erythema.   Eyes: Conjunctivae and lids are normal. Right eye exhibits no discharge. Left eye exhibits no discharge. No scleral icterus.   Sclera clear bilat   Neck: Trachea normal, normal range of motion, full passive range of motion without pain and phonation normal. Neck supple.   Cardiovascular: Normal rate, regular rhythm and normal pulses.   Pulmonary/Chest: Effort normal and breath sounds normal. No respiratory distress.   Abdominal: Soft. Normal appearance and bowel sounds are normal. He exhibits no distension, no pulsatile midline mass and no mass. There is no tenderness.   Musculoskeletal: He exhibits tenderness. He exhibits no edema or deformity.        Right shoulder: He exhibits decreased range of motion.        Lumbar back: He exhibits tenderness and pain.        Arms:       Legs:  Neurological: He is alert and oriented to person, place, and time. He exhibits normal muscle tone. Coordination normal.   Skin: Skin is warm, dry and intact. He is not diaphoretic. No pallor.   Psychiatric: He has a normal mood and affect. His speech is normal and behavior is normal. Judgment and thought content normal. Cognition and memory are normal.   Nursing note and vitals reviewed.      Assessment:       1. Encounter for wound care    2. Chronic right shoulder pain    3. Chronic low back pain without sciatica, unspecified back pain laterality        Plan:         Encounter for wound care    Chronic right shoulder pain    Chronic low back pain without sciatica, unspecified back pain laterality      Patient Instructions     Wound Check, Infection  You have a wound that has become  infected. The wound will not heal properly unless the infection is cleared. Infection in a wound may also spread if it is not treated. In most cases, antibiotic medicines are prescribed to treat a wound infection.   Symptoms of a wound infection include:  · Redness or swelling around the wound  · Warmth coming from the wound  · New or worsening pain  · Red streaks around the wound  · Draining pus  · Fever  Home care  Follow all directions you are given to treat the infection.  Medicines  Take all medicines as prescribed.   · If you were given antibiotics, take them until they are gone or your healthcare provider tells you to stop. It is vital to finish the antibiotics even if you feel better. If you do not finish them, the infection may come back and be harder to treat.  · If your infection is not responding to the medicines you are taking, you may be prescribed new medicines.  · Take medicine for pain as directed by your healthcare provider.  Wound care  Care for your wound as directed by your healthcare provider.  · Apply a warm compress (clean cloth soaked in hot water) to the infected area for about 5 to 10 minutes at a time. Be very careful not to burn yourself. Test the cloth on a non-infected area to make sure it is not too hot.  · Continue to change the dressing daily. If it becomes wet, stained with wound fluid, or dirty, change it sooner. To change it:  ¨ Wash your hands with soap and water before changing the dressing.  ¨ Carefully remove the dressing and tape. If it sticks to the wound, you may need to wet it a little to remove it. (Do not do this if your healthcare provider has told you not to.)  ¨ Gently clean the wound with clean water (or saline) using gauze, a clean washcloth, or cotton swab.  ¨ Do not use soap, alcohol, peroxide or other cleansers.  ¨ If you were told to dry the wound before putting on a new dressing, gently pat. Do not rub.  ¨ Throw out the old dressing.  ¨ Wash your hands again  before opening the new, clean dressing.  ¨ Wash your hands again when you are done.  Follow-up care  Follow up with your healthcare provider as advised. If a culture was done, you will be notified if your treatment needs to change. Call as directed for the results.  When to seek medical advice  Call your health care provider right away if any of these occur:  · Symptoms of infection don't start to improve within 2 days of starting antibiotics  · Symptoms of infection get worse  · New symptoms, such as red streaks around the wound  · Fever of 100.4°F (38.0°C) or higher for more than 2 days after starting the antibiotics  Date Last Reviewed: 8/10/2015  © 6137-1788 [employer]. [address] [place] [address], [place] [address]. All rights reserved. This information is not intended as a substitute for professional medical care. Always follow your healthcare professional's instructions.

## 2018-12-13 ENCOUNTER — OFFICE VISIT (OUTPATIENT)
Dept: URGENT CARE | Facility: CLINIC | Age: 58
End: 2018-12-13
Payer: MEDICAID

## 2018-12-13 VITALS
OXYGEN SATURATION: 99 % | HEART RATE: 92 BPM | DIASTOLIC BLOOD PRESSURE: 87 MMHG | WEIGHT: 188 LBS | RESPIRATION RATE: 16 BRPM | TEMPERATURE: 98 F | SYSTOLIC BLOOD PRESSURE: 138 MMHG | BODY MASS INDEX: 26.92 KG/M2 | HEIGHT: 70 IN

## 2018-12-13 DIAGNOSIS — J40 BRONCHITIS: Primary | ICD-10-CM

## 2018-12-13 DIAGNOSIS — F17.200 TOBACCO DEPENDENCE: ICD-10-CM

## 2018-12-13 DIAGNOSIS — R05.9 COUGH: ICD-10-CM

## 2018-12-13 PROCEDURE — 99214 OFFICE O/P EST MOD 30 MIN: CPT | Mod: S$GLB,,, | Performed by: NURSE PRACTITIONER

## 2018-12-13 PROCEDURE — 71046 X-RAY EXAM CHEST 2 VIEWS: CPT | Mod: S$GLB,,, | Performed by: RADIOLOGY

## 2018-12-13 RX ORDER — ACYCLOVIR 400 MG/1
400 TABLET ORAL 3 TIMES DAILY
Refills: 0 | COMMUNITY
Start: 2018-10-03 | End: 2019-03-21

## 2018-12-13 RX ORDER — AZITHROMYCIN 250 MG/1
TABLET, FILM COATED ORAL
Qty: 6 TABLET | Refills: 0 | Status: SHIPPED | OUTPATIENT
Start: 2018-12-13 | End: 2019-03-21

## 2018-12-13 RX ORDER — MINOCYCLINE HYDROCHLORIDE 100 MG/1
CAPSULE ORAL
Refills: 0 | COMMUNITY
Start: 2018-09-13 | End: 2019-03-21

## 2018-12-13 RX ORDER — DEXAMETHASONE SODIUM PHOSPHATE 100 MG/10ML
8 INJECTION INTRAMUSCULAR; INTRAVENOUS
Status: COMPLETED | OUTPATIENT
Start: 2018-12-13 | End: 2018-12-13

## 2018-12-13 RX ORDER — TRIAMCINOLONE ACETONIDE 1 MG/G
CREAM TOPICAL
Refills: 1 | COMMUNITY
Start: 2018-10-25 | End: 2019-03-21

## 2018-12-13 RX ORDER — AMMONIUM LACTATE 12 G/100G
LOTION TOPICAL
Refills: 0 | COMMUNITY
Start: 2018-11-28 | End: 2019-03-21

## 2018-12-13 RX ORDER — BENZONATATE 100 MG/1
CAPSULE ORAL
Refills: 1 | COMMUNITY
Start: 2018-11-23 | End: 2019-03-21

## 2018-12-13 RX ORDER — KETOCONAZOLE 20 MG/ML
SHAMPOO, SUSPENSION TOPICAL
Refills: 3 | COMMUNITY
Start: 2018-10-25 | End: 2019-03-21

## 2018-12-13 RX ORDER — CHLOPHEDIANOL HYDROCHLORIDE, DEXBROMPHENIRAMINE MALEATE 12.5; 1 MG/5ML; MG/5ML
LIQUID ORAL
Refills: 0 | COMMUNITY
Start: 2018-12-05 | End: 2019-03-21

## 2018-12-13 RX ADMIN — DEXAMETHASONE SODIUM PHOSPHATE 8 MG: 100 INJECTION INTRAMUSCULAR; INTRAVENOUS at 04:12

## 2018-12-13 NOTE — PATIENT INSTRUCTIONS
Acute Bronchitis  Your healthcare provider has told you that you have acute bronchitis. Bronchitis is infection or inflammation of the bronchial tubes (airways in the lungs). Normally, air moves easily in and out of the airways. Bronchitis narrows the airways, making it harder for air to flow in and out of the lungs. This causes symptoms such as shortness of breath, coughing up yellow or green mucus, and wheezing. Bronchitis can be acute or chronic. Acute means the condition comes on quickly and goes away in a short time, usually within 3 to 10 days. Chronic means a condition lasts a long time and often comes back.    What causes acute bronchitis?  Acute bronchitis almost always starts as a viral respiratory infection, such as a cold or the flu. Certain factors make it more likely for a cold or flu to turn into bronchitis. These include being very young, being elderly, having a heart or lung problem, or having a weak immune system. Cigarette smoking also makes bronchitis more likely.  When bronchitis develops, the airways become swollen. The airways may also become infected with bacteria. This is known as a secondary infection.  Diagnosing acute bronchitis  Your healthcare provider will examine you and ask about your symptoms and health history. You may also have a sputum culture to test the fluid in your lungs. Chest X-rays may be done to look for infection in the lungs.  Treating acute bronchitis  Bronchitis usually clears up as the cold or flu goes away. You can help feel better faster by doing the following:  · Take medicine as directed. You may be told to take ibuprofen or other over-the-counter medicines. These help relieve inflammation in your bronchial tubes. Your healthcare provider may prescribe an inhaler to help open up the bronchial tubes. Most of the time, acute bronchitis is caused by a viral infection. Antibiotics are usually not prescribed for viral infections.  · Drink plenty of fluids, such as  water, juice, or warm soup. Fluids loosen mucus so that you can cough it up. This helps you breathe more easily. Fluids also prevent dehydration.  · Make sure you get plenty of rest.  · Do not smoke. Do not allow anyone else to smoke in your home.  Recovery and follow-up  Follow up with your doctor as you are told. You will likely feel better in a week or two. But a dry cough can linger beyond that time. Let your doctor know if you still have symptoms (other than a dry cough) after 2 weeks, or if youre prone to getting bronchial infections. Take steps to protect yourself from future infections. These steps include stopping smoking and avoiding tobacco smoke, washing your hands often, and getting a yearly flu shot.  When to call your healthcare provider  Call the healthcare provider if you have any of the following:  · Fever of 100.4°F (38.0°C) or higher, or as advised  · Symptoms that get worse, or new symptoms  · Trouble breathing  · Symptoms that dont start to improve within a week, or within 3 days of taking antibiotics   Date Last Reviewed: 12/1/2016  © 8594-8451 [employer]. [address] [place] [address]. All rights reserved. This information is not intended as a substitute for professional medical care. Always follow your healthcare professional's instructions.    Symptomatic treatment:    Tylenol every 4 hours  Ibuprofen ever 6 hours  salt water gargles  Cold-eeze helps to reduce the duration of sore throat symptoms  Cepachol helps to numb the discomfort  Chloroseptic spray  Nasal saline spray reduces inflammation and dryness  Warm face compresses as often as you can  Vicks vapor rub at night  Flonase OTC or Nasacort OTC  Simple foods like chicken noodle soup help  Delsym helps with coughing at night  Zyrtec/Claritin during the day and Benadryl at night may help if allergy component   Rest as much as you can                                                          If we discussed  "that I think your illness is viral it will not respond to antibiotics and it will last 10-14 days.  Flonase (fluticasone) is a nasal spray which is available over the counter and may help with your symptoms   Zyrtec D, Claritin D or allegra D can also help with symptoms of congestion and drainage.   If you have hypertension avoid using the "D" which is the decongestant   If you just have drainage you can take plain zyrtec, claritin or allegra   If you just have a congested feeling you can take pseudoephedrine (unless you have high blood pressure) which you have to sign for behind the counter. Do not buy the phenylephrine which is on the shelf as it is not effective   Tylenol or ibuprofen can also be used as directed for pain unless you have an allergy to them or medical condition such as stomach ulcers, kidney or liver disease or blood thinners etc for which you should not be taking these type of medications.   If you are flying in the next few days Afrin nose drops for the airplane flight upon take off and landing may help. Other than at those times refrain from using afrin.       Please arrange follow up with your primary medical clinic as soon as possible. You must understand that you've received an Urgent Care treatment only and that you may be released before all of your medical problems are known or treated. You, the patient, will arrange for follow up as instructed. If your symptoms worsen or fail to improve you should go to the Emergency Room.      "

## 2018-12-13 NOTE — PROGRESS NOTES
"Subjective:       Patient ID: Thaddeus Moss is a 58 y.o. male.    Vitals:  height is 5' 10" (1.778 m) and weight is 85.3 kg (188 lb). His temperature is 98 °F (36.7 °C). His blood pressure is 138/87 and his pulse is 92. His respiration is 16 and oxygen saturation is 99%.     Chief Complaint: Cough    Sx x 3wk.    Reports visiting another UC within the past 3 wk. Reports Rx'd meds are not effective (tessalon perles and Chlo Hist). Concerned he may have pneumonia, is requesting antibiotics and steroid injection, pt very insistent on his requests even though CXR negative for pneumonia.     Pt admits to smoking cigarettes, counseled on quitting.      Cough   This is a new problem. The current episode started 1 to 4 weeks ago. The problem has been unchanged. The cough is non-productive. Associated symptoms include rhinorrhea. Pertinent negatives include no chills, ear pain, eye redness, fever, hemoptysis, myalgias, rash, sore throat, shortness of breath or wheezing. The symptoms are aggravated by cold air. He has tried prescription cough suppressant for the symptoms. The treatment provided mild relief.       Constitution: Negative for chills, sweating, fatigue and fever.   HENT: Positive for congestion. Negative for ear pain, sinus pain, sinus pressure, sore throat and voice change.    Neck: Negative for painful lymph nodes.   Eyes: Negative for eye redness.   Respiratory: Positive for cough. Negative for chest tightness, sputum production, bloody sputum, COPD, shortness of breath, stridor, wheezing and asthma.    Gastrointestinal: Negative for nausea and vomiting.   Musculoskeletal: Negative for muscle ache.   Skin: Negative for rash.   Allergic/Immunologic: Negative for seasonal allergies and asthma.   Hematologic/Lymphatic: Negative for swollen lymph nodes.       Objective:      Physical Exam   Constitutional: He is oriented to person, place, and time. He appears well-developed and well-nourished. He is cooperative.  " Non-toxic appearance. He does not appear ill. No distress.   HENT:   Head: Normocephalic and atraumatic.   Right Ear: Hearing, external ear and ear canal normal. A middle ear effusion is present.   Left Ear: Hearing, external ear and ear canal normal. A middle ear effusion is present.   Nose: Rhinorrhea present. No mucosal edema or nasal deformity. No epistaxis. Right sinus exhibits maxillary sinus tenderness. Right sinus exhibits no frontal sinus tenderness. Left sinus exhibits maxillary sinus tenderness. Left sinus exhibits no frontal sinus tenderness.   Mouth/Throat: Uvula is midline, oropharynx is clear and moist and mucous membranes are normal. No trismus in the jaw. Normal dentition. No uvula swelling. No posterior oropharyngeal erythema.   Eyes: Conjunctivae and lids are normal. No scleral icterus.   Sclera clear bilat   Neck: Trachea normal, full passive range of motion without pain and phonation normal. Neck supple.   Cardiovascular: Normal rate, regular rhythm, normal heart sounds, intact distal pulses and normal pulses.   Pulmonary/Chest: Effort normal and breath sounds normal. No respiratory distress.   Persistent cough present during exam   Abdominal: Soft. Normal appearance and bowel sounds are normal. He exhibits no distension. There is no tenderness.   Musculoskeletal: Normal range of motion. He exhibits no edema or deformity.   Lymphadenopathy:        Head (right side): No submental, no submandibular, no tonsillar, no preauricular, no posterior auricular and no occipital adenopathy present.        Head (left side): No submental, no submandibular, no tonsillar, no preauricular, no posterior auricular and no occipital adenopathy present.     He has no cervical adenopathy.   Neurological: He is alert and oriented to person, place, and time. He exhibits normal muscle tone. Coordination normal.   Skin: Skin is warm, dry and intact. He is not diaphoretic. No pallor.   Psychiatric: He has a normal mood and  affect. His speech is normal and behavior is normal. Judgment and thought content normal. Cognition and memory are normal.   Nursing note and vitals reviewed.    CXR IMPRESSION:  FINDINGS:  There is no consolidation, effusion, or pneumothorax.  Cardiomediastinal silhouette is unremarkable.  Regional osseous structures are unremarkable.      Impression       No acute cardiopulmonary process.       Assessment:       1. Bronchitis    2. Cough        Plan:         Bronchitis    Cough  -     XR CHEST PA AND LATERAL; Future; Expected date: 12/13/2018    Other orders  -     azithromycin (Z-IRIS) 250 MG tablet; Take 2 tablets by mouth on day 1; Take 1 tablet by mouth on days 2-5  Dispense: 6 tablet; Refill: 0  -     dexamethasone injection 8 mg

## 2019-03-21 ENCOUNTER — HOSPITAL ENCOUNTER (EMERGENCY)
Facility: HOSPITAL | Age: 59
Discharge: HOME | End: 2019-03-21
Attending: EMERGENCY MEDICINE
Payer: MEDICAID

## 2019-03-21 VITALS
BODY MASS INDEX: 25.05 KG/M2 | DIASTOLIC BLOOD PRESSURE: 99 MMHG | OXYGEN SATURATION: 100 % | RESPIRATION RATE: 18 BRPM | HEIGHT: 70 IN | SYSTOLIC BLOOD PRESSURE: 152 MMHG | HEART RATE: 80 BPM | TEMPERATURE: 98 F | WEIGHT: 175 LBS

## 2019-03-21 DIAGNOSIS — R63.4 WEIGHT LOSS: ICD-10-CM

## 2019-03-21 DIAGNOSIS — R03.0 ELEVATED BLOOD PRESSURE READING: ICD-10-CM

## 2019-03-21 DIAGNOSIS — R53.83 FATIGUE, UNSPECIFIED TYPE: Primary | ICD-10-CM

## 2019-03-21 LAB
ALBUMIN SERPL BCP-MCNC: 3.8 G/DL
ALP SERPL-CCNC: 117 U/L
ALT SERPL W/O P-5'-P-CCNC: 10 U/L
ANION GAP SERPL CALC-SCNC: 7 MMOL/L
AST SERPL-CCNC: 16 U/L
BASOPHILS # BLD AUTO: 0.03 K/UL
BASOPHILS NFR BLD: 0.4 %
BILIRUB SERPL-MCNC: 0.3 MG/DL
BILIRUB UR QL STRIP: NEGATIVE
BNP SERPL-MCNC: <10 PG/ML
BUN SERPL-MCNC: 16 MG/DL
CALCIUM SERPL-MCNC: 9.4 MG/DL
CHLORIDE SERPL-SCNC: 107 MMOL/L
CLARITY UR: CLEAR
CO2 SERPL-SCNC: 29 MMOL/L
COLOR UR: YELLOW
CREAT SERPL-MCNC: 1 MG/DL
DIFFERENTIAL METHOD: ABNORMAL
EOSINOPHIL # BLD AUTO: 0.1 K/UL
EOSINOPHIL NFR BLD: 1.3 %
ERYTHROCYTE [DISTWIDTH] IN BLOOD BY AUTOMATED COUNT: 15.7 %
EST. GFR  (AFRICAN AMERICAN): >60 ML/MIN/1.73 M^2
EST. GFR  (NON AFRICAN AMERICAN): >60 ML/MIN/1.73 M^2
GLUCOSE SERPL-MCNC: 84 MG/DL
GLUCOSE UR QL STRIP: NEGATIVE
HCT VFR BLD AUTO: 44.7 %
HGB BLD-MCNC: 14.3 G/DL
HGB UR QL STRIP: ABNORMAL
HIV1+2 IGG SERPL QL IA.RAPID: NEGATIVE
KETONES UR QL STRIP: NEGATIVE
LEUKOCYTE ESTERASE UR QL STRIP: NEGATIVE
LYMPHOCYTES # BLD AUTO: 2.4 K/UL
LYMPHOCYTES NFR BLD: 35.3 %
MCH RBC QN AUTO: 27.6 PG
MCHC RBC AUTO-ENTMCNC: 32 G/DL
MCV RBC AUTO: 86 FL
MICROSCOPIC COMMENT: NORMAL
MONOCYTES # BLD AUTO: 0.3 K/UL
MONOCYTES NFR BLD: 3.7 %
NEUTROPHILS # BLD AUTO: 4 K/UL
NEUTROPHILS NFR BLD: 59.3 %
NITRITE UR QL STRIP: NEGATIVE
PH UR STRIP: 6 [PH] (ref 5–8)
PLATELET # BLD AUTO: 244 K/UL
PMV BLD AUTO: 10 FL
POTASSIUM SERPL-SCNC: 4 MMOL/L
PROT SERPL-MCNC: 7.3 G/DL
PROT UR QL STRIP: NEGATIVE
RBC # BLD AUTO: 5.19 M/UL
RBC #/AREA URNS HPF: 1 /HPF (ref 0–4)
SODIUM SERPL-SCNC: 143 MMOL/L
SP GR UR STRIP: 1.02 (ref 1–1.03)
TROPONIN I SERPL DL<=0.01 NG/ML-MCNC: <0.006 NG/ML
TSH SERPL DL<=0.005 MIU/L-ACNC: 1.15 UIU/ML
URN SPEC COLLECT METH UR: ABNORMAL
UROBILINOGEN UR STRIP-ACNC: ABNORMAL EU/DL
WBC # BLD AUTO: 6.72 K/UL

## 2019-03-21 PROCEDURE — 83970 ASSAY OF PARATHORMONE: CPT

## 2019-03-21 PROCEDURE — 80053 COMPREHEN METABOLIC PANEL: CPT

## 2019-03-21 PROCEDURE — 85025 COMPLETE CBC W/AUTO DIFF WBC: CPT

## 2019-03-21 PROCEDURE — 99285 EMERGENCY DEPT VISIT HI MDM: CPT | Mod: 25

## 2019-03-21 PROCEDURE — 84443 ASSAY THYROID STIM HORMONE: CPT

## 2019-03-21 PROCEDURE — 83880 ASSAY OF NATRIURETIC PEPTIDE: CPT

## 2019-03-21 PROCEDURE — 86703 HIV-1/HIV-2 1 RESULT ANTBDY: CPT

## 2019-03-21 PROCEDURE — 93010 EKG 12-LEAD: ICD-10-PCS | Mod: ,,, | Performed by: INTERNAL MEDICINE

## 2019-03-21 PROCEDURE — 93010 ELECTROCARDIOGRAM REPORT: CPT | Mod: ,,, | Performed by: INTERNAL MEDICINE

## 2019-03-21 PROCEDURE — 84153 ASSAY OF PSA TOTAL: CPT

## 2019-03-21 PROCEDURE — 84484 ASSAY OF TROPONIN QUANT: CPT

## 2019-03-21 PROCEDURE — 93005 ELECTROCARDIOGRAM TRACING: CPT

## 2019-03-21 PROCEDURE — 81000 URINALYSIS NONAUTO W/SCOPE: CPT

## 2019-03-21 RX ORDER — AMLODIPINE BESYLATE 5 MG/1
5 TABLET ORAL DAILY
Qty: 30 TABLET | Refills: 3 | Status: SHIPPED | OUTPATIENT
Start: 2019-03-21 | End: 2020-03-20

## 2019-03-21 NOTE — ED TRIAGE NOTES
Pt states that he took his BP at [facility] yesterday and it was 170/80. Pt comes in reporting fatigue and lightheadedness. Pt has no hx of HTN. Denies chest pain, sob

## 2019-03-21 NOTE — ED TRIAGE NOTES
Pt reports he is feeling fatigued and lightheaded.  Reports elevated blood pressure at [facility] yesterday.  Pt also reports 10+lb weight loss over the past 1-2 months without trying.  Pt reports being seen by urgent care yesterday and only receiving counseling with no tests being run.  Pt denies being seen by a doctor in approximately 2 years, reports being prescribed blood pressure medication in the past and stopping without direction of a doctor.  He denies cp, sob, f/c/n/v/d.  VSS, NAD.

## 2019-03-21 NOTE — ED PROVIDER NOTES
"Encounter Date: 3/21/2019    SCRIBE #1 NOTE: I, David Miranda, am scribing for, and in the presence of,  Shea Navarro MD . I have scribed the following portions of the note - Other sections scribed: HPI, ROS, PE.       History     Chief Complaint   Patient presents with    Fatigue     Pt states that his BP at [facility] yesterday was 170/80. Pt states he has been feeling lightheaded and fatigued since yesterday. Denies chest pain, sob     CC: Fatigue    This is an emergent evaluation of a 58-year-old male with hypertension not on medication and Paget's disease who presents with fatigue for about 4 days and unintentional weight loss for about 2-3 months.  The patient reports that he has noticed increasing fatigue and lightheadedness for the last 4 days.  He took a nap yesterday and today, which is atypical.  Patient reports a 10 lb weight loss in last 2-3 months despite a good appetite.  Patient works as a  and  and also does construction; he has noticed that he feels fatigued at work but has not let himself take breaks.  Patient reports that yesterday and today he took his BP at [facility] and it was elevated, 170s/100s.  Patient was told he had high blood pressure years ago but has not wanted to take medications.      Patient reports "occasional throbbing pain" in LLE s/p falling into a hole and injuring his leg about 1 month ago.     No PMD at present.    Epic: Last bloodwork a year ago. Elevated PSA and PTH.       The history is provided by the patient. No  was used.     Review of patient's allergies indicates:  No Known Allergies  Past Medical History:   Diagnosis Date    Paget disease of bone      No past surgical history on file.  Family History   Problem Relation Age of Onset    Diabetes Mother     Diabetes Father     Hypertension Sister     Hypertension Brother     Hypertension Sister      Social History     Tobacco Use    Smoking status: Former Smoker    Smokeless " tobacco: Never Used   Substance Use Topics    Alcohol use: Yes    Drug use: No     Review of Systems   Constitutional: Positive for fatigue and unexpected weight change (10 pounds in 2 months). Negative for appetite change, chills and fever.   HENT: Negative for congestion, rhinorrhea and sore throat.    Eyes: Negative for redness and visual disturbance.   Respiratory: Negative for cough and shortness of breath.    Cardiovascular: Negative for chest pain.   Gastrointestinal: Negative for abdominal pain, nausea and vomiting.   Genitourinary: Negative for dysuria.   Musculoskeletal: Negative for back pain, gait problem and neck pain.        (+) LLE pain   Skin: Negative for color change, rash and wound.   Neurological: Positive for light-headedness. Negative for syncope and headaches.       Physical Exam     Initial Vitals [03/21/19 1756]   BP Pulse Resp Temp SpO2   (!) 170/97 85 17 98.5 °F (36.9 °C) 100 %      MAP       --         Physical Exam    Nursing note and vitals reviewed.  Constitutional: He appears well-developed and well-nourished. He is not diaphoretic.   Awake, alert, well-appearing male. Thin.   HENT:   Head: Normocephalic and atraumatic.   Mouth/Throat: Oropharynx is clear and moist.   Left cervical lymphadenopathy    Eyes: Conjunctivae and EOM are normal. Pupils are equal, round, and reactive to light.   Neck: Normal range of motion. Neck supple.   Cardiovascular: Normal rate, regular rhythm, normal heart sounds and intact distal pulses.   No murmur heard.  Pulmonary/Chest: Breath sounds normal. No respiratory distress. He has no wheezes. He has no rhonchi. He has no rales.   Abdominal: Soft. Bowel sounds are normal. He exhibits no distension. There is no tenderness. There is no rebound and no guarding.   Musculoskeletal: Normal range of motion. He exhibits no edema or tenderness.   Neurological: He is alert and oriented to person, place, and time. He has normal strength.   Moving all extremities    Skin: Skin is warm and dry.   Excoriations to back, largely healed.   Psychiatric: He has a normal mood and affect.         ED Course   Procedures  Labs Reviewed   CBC W/ AUTO DIFFERENTIAL - Abnormal; Notable for the following components:       Result Value    RDW 15.7 (*)     Mono% 3.7 (*)     All other components within normal limits   COMPREHENSIVE METABOLIC PANEL - Abnormal; Notable for the following components:    Anion Gap 7 (*)     All other components within normal limits   URINALYSIS, REFLEX TO URINE CULTURE - Abnormal; Notable for the following components:    Occult Blood UA 1+ (*)     Urobilinogen, UA 4.0-6.0 (*)     All other components within normal limits    Narrative:     Preferred Collection Type->Urine, Clean Catch   PSA, SCREENING - Abnormal; Notable for the following components:    PSA, SCREEN 4.7 (*)     All other components within normal limits   RAPID HIV   B-TYPE NATRIURETIC PEPTIDE   TROPONIN I   TSH   PTH, INTACT   URINALYSIS MICROSCOPIC    Narrative:     Preferred Collection Type->Urine, Clean Catch     EKG Readings: (Independently Interpreted)   18:23: NSR, HR 89. Normal axis. No ectopy. No STEMI.      ECG Results          EKG 12-lead (In process)  Result time 03/22/19 05:51:22    In process by Interface, Lab In [facility] (03/22/19 05:51:22)                 Narrative:    Test Reason : R03.0,    Vent. Rate : 089 BPM     Atrial Rate : 089 BPM     P-R Int : 164 ms          QRS Dur : 080 ms      QT Int : 376 ms       P-R-T Axes : 075 006 048 degrees     QTc Int : 457 ms    Normal sinus rhythm  Right atrial enlargement  Borderline Abnormal ECG  No previous ECGs available    Referred By: [email or web address]   SELF           Confirmed By:                   In process by Interface, Lab In [facility] (03/22/19 05:45:07)                 Narrative:    Test Reason : R03.0,    Vent. Rate : 089 BPM     Atrial Rate : 089 BPM     P-R Int : 164 ms          QRS Dur : 080 ms      QT Int : 376 ms       P-R-T Axes : 075  006 048 degrees     QTc Int : 457 ms    Normal sinus rhythm  Right atrial enlargement  Borderline Abnormal ECG  No previous ECGs available    Referred By: [email or web address]   SELF           Confirmed By:                             Imaging Results          X-Ray Chest AP Portable (Final result)  Result time 03/21/19 19:18:47    Final result by Presley Chiu MD (03/21/19 19:18:47)                 Impression:      As above.      Electronically signed by: Presley Chiu MD  Date:    03/21/2019  Time:    19:18             Narrative:    EXAMINATION:  XR CHEST AP PORTABLE    CLINICAL HISTORY:  Elevated blood-pressure reading, without diagnosis of hypertension    TECHNIQUE:  Single frontal view of the chest was performed.    FINDINGS:  The lungs are clear.  There is no pneumothorax or pleural fluid.  The cardiac silhouette is not enlarged.  The osseous structures are unremarkable.                              X-Rays:   Independently Interpreted Readings:   Other Readings:  CXR NAD    Medical Decision Making:   History:   Old Medical Records: I decided to obtain old medical records.  Old Records Summarized: records from previous admission(s).  Initial Assessment:   58 y.o. Male with fatigue x days, unintentional weight loss x months.   Differential Diagnosis:   Ddx includes thyroid disease, ARF, undiagnosed malignancy, undiagnosed HIV, other.  Independently Interpreted Test(s):   I have ordered and independently interpreted X-rays - see prior notes.  I have ordered and independently interpreted EKG Reading(s) - see prior notes  Clinical Tests:   Lab Tests: Ordered and Reviewed  Radiological Study: Ordered and Reviewed  Medical Tests: Ordered and Reviewed  ED Management:  Extensive ED workup (CXR, EKG, labs with cardiacs/thyroid/HIV) reassuring.     Unclear etiology of fatigue. I have stressed PMD f/u to patient to discuss further tests/imaging. I have suggested that patient resume norvasc pending f/u to PMD and I have refilled this  medication.     D/c'd in NAD.             Scribe Attestation:   Scribe #1: I performed the above scribed service and the documentation accurately describes the services I performed. I attest to the accuracy of the note.    Attending Attestation:           Physician Attestation for Scribe:  Physician Attestation Statement for Scribe #1: I, Shea Navarro MD, reviewed documentation, as scribed by David Miranda  in my presence, and it is both accurate and complete.                    Clinical Impression:       ICD-10-CM ICD-9-CM   1. Fatigue, unspecified type R53.83 780.79   2. Elevated blood pressure reading R03.0 796.2   3. Weight loss R63.4 783.21                                Shea Navarro MD  03/24/19 0652

## 2019-03-22 LAB
COMPLEXED PSA SERPL-MCNC: 4.7 NG/ML
PTH-INTACT SERPL-MCNC: 70.7 PG/ML

## 2020-10-21 ENCOUNTER — HOSPITAL ENCOUNTER (EMERGENCY)
Facility: HOSPITAL | Age: 60
Discharge: PSYCHIATRIC OR SUBSTANCE USE FACILITY | End: 2020-10-22
Attending: EMERGENCY MEDICINE
Payer: MEDICAID

## 2020-10-21 DIAGNOSIS — F22 PARANOID DELUSION: Primary | ICD-10-CM

## 2020-10-21 LAB
BASOPHILS # BLD AUTO: 0.05 K/UL (ref 0–0.2)
BASOPHILS NFR BLD: 1 % (ref 0–1.9)
CTP QC/QA: YES
DIFFERENTIAL METHOD: ABNORMAL
EOSINOPHIL # BLD AUTO: 0.1 K/UL (ref 0–0.5)
EOSINOPHIL NFR BLD: 1.2 % (ref 0–8)
ERYTHROCYTE [DISTWIDTH] IN BLOOD BY AUTOMATED COUNT: 15.8 % (ref 11.5–14.5)
HCT VFR BLD AUTO: 47.8 % (ref 40–54)
HGB BLD-MCNC: 15 G/DL (ref 14–18)
IMM GRANULOCYTES # BLD AUTO: 0.01 K/UL (ref 0–0.04)
IMM GRANULOCYTES NFR BLD AUTO: 0.2 % (ref 0–0.5)
LYMPHOCYTES # BLD AUTO: 1.6 K/UL (ref 1–4.8)
LYMPHOCYTES NFR BLD: 33.1 % (ref 18–48)
MCH RBC QN AUTO: 27.5 PG (ref 27–31)
MCHC RBC AUTO-ENTMCNC: 31.4 G/DL (ref 32–36)
MCV RBC AUTO: 88 FL (ref 82–98)
MONOCYTES # BLD AUTO: 0.2 K/UL (ref 0.3–1)
MONOCYTES NFR BLD: 4.7 % (ref 4–15)
NEUTROPHILS # BLD AUTO: 2.9 K/UL (ref 1.8–7.7)
NEUTROPHILS NFR BLD: 59.8 % (ref 38–73)
NRBC BLD-RTO: 0 /100 WBC
PLATELET # BLD AUTO: 271 K/UL (ref 150–350)
PMV BLD AUTO: 8.9 FL (ref 9.2–12.9)
RBC # BLD AUTO: 5.46 M/UL (ref 4.6–6.2)
SARS-COV-2 RDRP RESP QL NAA+PROBE: NEGATIVE
WBC # BLD AUTO: 4.89 K/UL (ref 3.9–12.7)

## 2020-10-21 PROCEDURE — U0002 COVID-19 LAB TEST NON-CDC: HCPCS | Performed by: EMERGENCY MEDICINE

## 2020-10-21 PROCEDURE — 84443 ASSAY THYROID STIM HORMONE: CPT

## 2020-10-21 PROCEDURE — 80307 DRUG TEST PRSMV CHEM ANLYZR: CPT

## 2020-10-21 PROCEDURE — 99284 EMERGENCY DEPT VISIT MOD MDM: CPT | Mod: ,,, | Performed by: PHYSICIAN ASSISTANT

## 2020-10-21 PROCEDURE — 80320 DRUG SCREEN QUANTALCOHOLS: CPT

## 2020-10-21 PROCEDURE — 80053 COMPREHEN METABOLIC PANEL: CPT

## 2020-10-21 PROCEDURE — 99284 PR EMERGENCY DEPT VISIT,LEVEL IV: ICD-10-PCS | Mod: ,,, | Performed by: PHYSICIAN ASSISTANT

## 2020-10-21 PROCEDURE — 99285 EMERGENCY DEPT VISIT HI MDM: CPT

## 2020-10-21 PROCEDURE — 80329 ANALGESICS NON-OPIOID 1 OR 2: CPT

## 2020-10-21 PROCEDURE — 85025 COMPLETE CBC W/AUTO DIFF WBC: CPT

## 2020-10-21 PROCEDURE — 81001 URINALYSIS AUTO W/SCOPE: CPT | Mod: 59

## 2020-10-21 RX ORDER — BENZONATATE 100 MG/1
200 CAPSULE ORAL
Status: COMPLETED | OUTPATIENT
Start: 2020-10-22 | End: 2020-10-22

## 2020-10-22 VITALS
HEART RATE: 86 BPM | TEMPERATURE: 98 F | SYSTOLIC BLOOD PRESSURE: 163 MMHG | RESPIRATION RATE: 14 BRPM | OXYGEN SATURATION: 100 % | DIASTOLIC BLOOD PRESSURE: 96 MMHG

## 2020-10-22 LAB
ALBUMIN SERPL BCP-MCNC: 4.3 G/DL (ref 3.5–5.2)
ALP SERPL-CCNC: 111 U/L (ref 55–135)
ALT SERPL W/O P-5'-P-CCNC: 10 U/L (ref 10–44)
AMPHET+METHAMPHET UR QL: NEGATIVE
ANION GAP SERPL CALC-SCNC: 10 MMOL/L (ref 8–16)
APAP SERPL-MCNC: <3 UG/ML (ref 10–20)
AST SERPL-CCNC: 18 U/L (ref 10–40)
BACTERIA #/AREA URNS AUTO: NORMAL /HPF
BARBITURATES UR QL SCN>200 NG/ML: NEGATIVE
BENZODIAZ UR QL SCN>200 NG/ML: NEGATIVE
BILIRUB SERPL-MCNC: 0.6 MG/DL (ref 0.1–1)
BILIRUB UR QL STRIP: NEGATIVE
BUN SERPL-MCNC: 11 MG/DL (ref 6–20)
BZE UR QL SCN: NEGATIVE
CALCIUM SERPL-MCNC: 9.5 MG/DL (ref 8.7–10.5)
CANNABINOIDS UR QL SCN: NEGATIVE
CHLORIDE SERPL-SCNC: 102 MMOL/L (ref 95–110)
CLARITY UR REFRACT.AUTO: CLEAR
CO2 SERPL-SCNC: 29 MMOL/L (ref 23–29)
COLOR UR AUTO: YELLOW
CREAT SERPL-MCNC: 1.1 MG/DL (ref 0.5–1.4)
CREAT UR-MCNC: 105 MG/DL (ref 23–375)
EST. GFR  (AFRICAN AMERICAN): >60 ML/MIN/1.73 M^2
EST. GFR  (NON AFRICAN AMERICAN): >60 ML/MIN/1.73 M^2
ETHANOL SERPL-MCNC: <10 MG/DL
GLUCOSE SERPL-MCNC: 84 MG/DL (ref 70–110)
GLUCOSE UR QL STRIP: NEGATIVE
HGB UR QL STRIP: ABNORMAL
KETONES UR QL STRIP: NEGATIVE
LEUKOCYTE ESTERASE UR QL STRIP: NEGATIVE
METHADONE UR QL SCN>300 NG/ML: NEGATIVE
MICROSCOPIC COMMENT: NORMAL
NITRITE UR QL STRIP: NEGATIVE
OPIATES UR QL SCN: NEGATIVE
PCP UR QL SCN>25 NG/ML: NEGATIVE
PH UR STRIP: 7 [PH] (ref 5–8)
POTASSIUM SERPL-SCNC: 3.9 MMOL/L (ref 3.5–5.1)
PROT SERPL-MCNC: 8.1 G/DL (ref 6–8.4)
PROT UR QL STRIP: NEGATIVE
RBC #/AREA URNS AUTO: 4 /HPF (ref 0–4)
SODIUM SERPL-SCNC: 141 MMOL/L (ref 136–145)
SP GR UR STRIP: 1.01 (ref 1–1.03)
TOXICOLOGY INFORMATION: NORMAL
TSH SERPL DL<=0.005 MIU/L-ACNC: 1.44 UIU/ML (ref 0.4–4)
URN SPEC COLLECT METH UR: ABNORMAL
WBC #/AREA URNS AUTO: 1 /HPF (ref 0–5)

## 2020-10-22 PROCEDURE — 25000003 PHARM REV CODE 250: Performed by: PHYSICIAN ASSISTANT

## 2020-10-22 RX ADMIN — BENZONATATE 200 MG: 100 CAPSULE ORAL at 12:10

## 2020-10-22 NOTE — ED NOTES
Pt remains in paper scrubs.  Room free from hazardous items.  Sitter remains at bedside with direct visual contact and q15min assessments being documented.  Pt in NAD and VSS at this time.  Pending medical clearance and psych placement.

## 2020-10-22 NOTE — ED NOTES
Pt remains in paper scrubs. Room free from hazardous items. Sitter remains at bedside with direct visual contact and q15 assessments being documented. Pt in NAD and VSS at this time. Pending psych placement.

## 2020-10-22 NOTE — ED PROVIDER NOTES
Encounter Date: 10/21/2020       History     Chief Complaint   Patient presents with    Psychiatric Evaluation     pt reports the FBI has been following him from [facility] & showing up at his job. States the FBI is in the parking lot in unmarked vehicles. Thinks someone is trying to kill Ms. Warren; Pt very tearful at triage.       [age] male to the ED POV with his aunt.  Family reports the patient has been paranoid for the past couple months.  Patient believes people are after him.  The patient believes that people are stealing from trying to harm him.  The patient appears paranoid and is delusional.  The patient states that has been in contact with attorneys, the FBI, [facility], and local law enforcement regarding people coming after him.  The patient is calm and cooperative with me.  The patient paranoid.  He is awake alert oriented.  The patient is insistent on showing me pictures of random cars and random people that he says are after him.    According to his aunt, he has been this way for 2 months. He has lost weight and is on the verge of loosing his job. He has been refusing to seek medical help. His aunt convinced him to come to the ED tonight.         Review of patient's allergies indicates:  No Known Allergies  Past Medical History:   Diagnosis Date    Paget disease of bone      History reviewed. No pertinent surgical history.  Family History   Problem Relation Age of Onset    Diabetes Mother     Diabetes Father     Hypertension Sister     Hypertension Brother     Hypertension Sister      Social History     Tobacco Use    Smoking status: Current Every Day Smoker     Types: Cigarettes    Smokeless tobacco: Never Used    Tobacco comment: Pt started smoking daily again this week after quitting for 6 years   Substance Use Topics    Alcohol use: Yes     Comment: daily past week    Drug use: No     Review of Systems   Constitutional: Negative for fever.   HENT: Negative for sore  throat.    Respiratory: Negative for shortness of breath.    Cardiovascular: Negative for chest pain.   Gastrointestinal: Negative for nausea.   Genitourinary: Negative for dysuria.   Musculoskeletal: Negative for back pain.   Skin: Negative for rash.   Neurological: Negative for weakness.   Hematological: Does not bruise/bleed easily.       Physical Exam     Initial Vitals [10/21/20 2202]   BP Pulse Resp Temp SpO2   (!) 176/112 (!) 116 20 98.3 °F (36.8 °C) 100 %      MAP       --         Physical Exam    Constitutional: Vital signs are normal. He appears well-developed and well-nourished.   HENT:   Head: Normocephalic and atraumatic.   Right Ear: Hearing normal.   Left Ear: Hearing normal.   Eyes: Conjunctivae are normal.   Cardiovascular: Normal rate and regular rhythm.   Abdominal: Soft. Normal appearance and bowel sounds are normal.   Musculoskeletal: Normal range of motion.   Neurological: He is alert and oriented to person, place, and time.   Skin: Skin is warm and intact.   Psychiatric: His mood appears anxious. His speech is rapid and/or pressured. Thought content is paranoid and delusional. Cognition and memory are impaired. He expresses impulsivity. He expresses no homicidal and no suicidal ideation. He expresses no suicidal plans and no homicidal plans.         ED Course   Procedures  Labs Reviewed   CBC W/ AUTO DIFFERENTIAL - Abnormal; Notable for the following components:       Result Value    Mean Corpuscular Hemoglobin Conc 31.4 (*)     RDW 15.8 (*)     MPV 8.9 (*)     Mono # 0.2 (*)     All other components within normal limits   ACETAMINOPHEN LEVEL - Abnormal; Notable for the following components:    Acetaminophen (Tylenol), Serum <3.0 (*)     All other components within normal limits   COMPREHENSIVE METABOLIC PANEL   TSH   ALCOHOL,MEDICAL (ETHANOL)   DRUG SCREEN PANEL, URINE EMERGENCY   DRUG SCREEN PANEL, URINE EMERGENCY    Narrative:     Specimen Source->Urine  ADD ON TEST DRUG SCREEN PANEL PER   EMELI FAIR ORDER# [id number]   10/21/2020  23:37    URINALYSIS, REFLEX TO URINE CULTURE   URINALYSIS MICROSCOPIC   SARS-COV-2 RDRP GENE    Narrative:     This test utilizes isothermal nucleic acid amplification   technology to detect the SARS-CoV-2 RdRp nucleic acid segment.   The analytical sensitivity (limit of detection) is 125 genome   equivalents/mL.   A POSITIVE result implies infection with the SARS-CoV-2 virus;   the patient is presumed to be contagious.     A NEGATIVE result means that SARS-CoV-2 nucleic acids are not   present above the limit of detection. A NEGATIVE result should be   treated as presumptive. It does not rule out the possibility of   COVID-19 and should not be the sole basis for treatment decisions.   If COVID-19 is strongly suspected based on clinical and exposure   history, re-testing using an alternate molecular assay should be   considered.   This test is only for use under the Food and Drug   Administration s Emergency Use Authorization (EUA).   Commercial kits are provided by [employer].   Performance characteristics of the EUA have been independently   verified by [facility] Department of   Pathology and Laboratory Medicine.   _________________________________________________________________   The authorized Fact Sheet for Healthcare Providers and the authorized Fact   Sheet for Patients of the ID NOW COVID-19 are available on the FDA   website:     https://www.fda.gov/media/961428/download  https://www.fda.gov/media/181896/download                Imaging Results    None          Medical Decision Making:   Initial Assessment:   59 yo M with paranoid delusions  Differential Diagnosis:   Acute psycosis, schizophrenia, electrolyte derangement, substance abuse  Clinical Tests:   Lab Tests: Ordered and Reviewed  ED Management:  ED workup without acute findings  The patient has remained calm and cooperative.  He remains delusional.  Outside of a urinalysis and tox  screen the patient is medically cleared.  He will be transferred out for mental health care.                        Medically cleared for psychiatry placement: 10/22/2020 12:26 AM                Clinical Impression:     ICD-10-CM ICD-9-CM   1. Paranoid delusion  F22 297.9                      Disposition:   Disposition: Transferred  Condition: Stable     ED Disposition Condition    Transfer to Psych Facility         ED Prescriptions     None        Follow-up Information    None                                      Reece Cintron PA-C  10/22/20 0028

## 2020-10-22 NOTE — ED NOTES
Adult Physical Assessment  LOC: Thaddeus Moss, 60 y.o. male verified via two identifiers.  The patient is awake, alert, oriented and speaking appropriately at this time. Pt states people following him and people talking to him. Aunt states this is not the case.   APPEARANCE: Patient resting comfortably and appears to be in no acute distress at this time. Patient is clean and well groomed, patient's clothing is properly fastened. Pt tearful.  SKIN:The skin is warm and dry, color consistent with ethnicity, patient has normal skin turgor and moist mucus membranes, skin intact, no breakdown or brusing noted.  MUSCULOSKELETAL: Patient moving all extremities well, no obvious swelling or deformities noted.  RESPIRATORY: Airway is open and patent, respirations are spontaneous, patient has a normal effort and rate, no accessory muscle use noted.  CARDIAC: Patient tachycardic, no periphreal edema noted in any extremity, capillary refill < 3 seconds in all extremities  ABDOMEN: Soft and non tender to palpation, no abdominal distention noted. Bowel sounds present in all four quadrants.  NEUROLOGIC: Eyes open spontaneously, behavior appropriate to situation, follows commands, facial expression symmetrical, bilateral hand grasp equal and even, purposeful motor response noted, normal sensation in all extremities when touched with a finger.

## 2020-10-22 NOTE — ED NOTES
Pt remains in paper scrubs. Room free from hazardous items. Sitter remains at bedside with direct visual contact and q15 assessments being documented. Pt in NAD and VSS at this time. Waiting transport to [facility].

## 2020-10-22 NOTE — ED NOTES
Pt remains in paper scrubs.  Room free from hazardous items.  Sitter remains at bedside with direct visual contact and q15min assessments being documented.  Pt in NAD and VSS at this time.  Pending psych placement.

## 2020-10-22 NOTE — ED NOTES
Pt remains in paper scrubs. Room free from hazardous items. Sitter remains at bedside with direct visual contact and q15 assessments being documented. Pt in NAD and VSS at this time. Waiting transport to [facility].

## 2020-10-22 NOTE — ED NOTES
Pt remains in paper scrubs. Room free from hazardous items. Sitter remains at bedside with direct visual contact and q15 assessments being documented. Pt in NAD and VSS at this time. Waiting transport to [facility].

## 2020-11-02 ENCOUNTER — HOSPITAL ENCOUNTER (EMERGENCY)
Facility: HOSPITAL | Age: 60
Discharge: HOME | End: 2020-11-02
Attending: EMERGENCY MEDICINE
Payer: MEDICAID

## 2020-11-02 VITALS
BODY MASS INDEX: 24.5 KG/M2 | TEMPERATURE: 98 F | HEART RATE: 90 BPM | DIASTOLIC BLOOD PRESSURE: 89 MMHG | SYSTOLIC BLOOD PRESSURE: 142 MMHG | OXYGEN SATURATION: 99 % | RESPIRATION RATE: 20 BRPM | WEIGHT: 175 LBS | HEIGHT: 71 IN

## 2020-11-02 DIAGNOSIS — M54.50 CHRONIC MIDLINE LOW BACK PAIN WITHOUT SCIATICA: ICD-10-CM

## 2020-11-02 DIAGNOSIS — M79.89 LEG SWELLING: Primary | ICD-10-CM

## 2020-11-02 DIAGNOSIS — M88.9 PAGET'S DISEASE OF BONE: ICD-10-CM

## 2020-11-02 DIAGNOSIS — G89.29 CHRONIC MIDLINE LOW BACK PAIN WITHOUT SCIATICA: ICD-10-CM

## 2020-11-02 LAB
BUN SERPL-MCNC: 26 MG/DL (ref 6–30)
CHLORIDE SERPL-SCNC: 102 MMOL/L (ref 95–110)
CREAT SERPL-MCNC: 1.1 MG/DL (ref 0.5–1.4)
GLUCOSE SERPL-MCNC: 110 MG/DL (ref 70–110)
HCT VFR BLD CALC: 45 %PCV (ref 36–54)
POC IONIZED CALCIUM: 1.17 MMOL/L (ref 1.06–1.42)
POC TCO2 (MEASURED): 27 MMOL/L (ref 23–29)
POTASSIUM BLD-SCNC: 4.2 MMOL/L (ref 3.5–5.1)
SAMPLE: NORMAL
SODIUM BLD-SCNC: 141 MMOL/L (ref 136–145)

## 2020-11-02 PROCEDURE — 25000003 PHARM REV CODE 250: Performed by: PHYSICIAN ASSISTANT

## 2020-11-02 PROCEDURE — 99284 EMERGENCY DEPT VISIT MOD MDM: CPT | Mod: 25

## 2020-11-02 PROCEDURE — 80047 BASIC METABLC PNL IONIZED CA: CPT

## 2020-11-02 PROCEDURE — 99284 EMERGENCY DEPT VISIT MOD MDM: CPT | Mod: ,,, | Performed by: PHYSICIAN ASSISTANT

## 2020-11-02 PROCEDURE — 99284 PR EMERGENCY DEPT VISIT,LEVEL IV: ICD-10-PCS | Mod: ,,, | Performed by: PHYSICIAN ASSISTANT

## 2020-11-02 RX ORDER — ACETAMINOPHEN 500 MG
1000 TABLET ORAL
Status: COMPLETED | OUTPATIENT
Start: 2020-11-02 | End: 2020-11-02

## 2020-11-02 RX ADMIN — ACETAMINOPHEN 1000 MG: 500 TABLET ORAL at 02:11

## 2020-11-02 NOTE — ED PROVIDER NOTES
Encounter Date: 11/2/2020       History     Chief Complaint   Patient presents with    Joint Swelling     fbi still following me, just got of behav health in [place], both my ankles are swollen     60-year-old male with Paget's disease and hypertension presents for bilateral ankle swelling and leg swelling for about a week.  He reports associated ankle pain which is worse with ambulation.  Denies any palliating factors, did not take any medications prior to arrival.  He has chronic low back pain which is unchanged from his baseline.  He is concerned about his Paget's disease and is requesting x-rays of his back. He denies any numbness/tingling/weakness, chest pain with shortness of breath or facial swelling.  He was recently started on amlodipine for elevated blood pressure.  He was recently discharged from a psychiatric facility admission for paranoid behavior.  He denies any hallucinations or thoughts of suicide or homicide.        Review of patient's allergies indicates:  No Known Allergies  Past Medical History:   Diagnosis Date    Paget disease of bone      No past surgical history on file.  Family History   Problem Relation Age of Onset    Diabetes Mother     Diabetes Father     Hypertension Sister     Hypertension Brother     Hypertension Sister      Social History     Tobacco Use    Smoking status: Current Every Day Smoker     Types: Cigarettes    Smokeless tobacco: Never Used    Tobacco comment: Pt started smoking daily again this week after quitting for 6 years   Substance Use Topics    Alcohol use: Yes     Comment: daily past week    Drug use: No     Review of Systems   Constitutional: Negative for fever.   HENT: Negative for sore throat.    Respiratory: Negative for cough and shortness of breath.    Cardiovascular: Positive for leg swelling. Negative for chest pain.   Gastrointestinal: Negative for nausea.   Genitourinary: Negative for dysuria and enuresis.   Musculoskeletal: Positive for back  pain. Negative for joint swelling and myalgias.   Skin: Negative for rash.   Neurological: Negative for weakness and numbness.   Hematological: Does not bruise/bleed easily.   Psychiatric/Behavioral: Negative for agitation, confusion, dysphoric mood, hallucinations and suicidal ideas. The patient is not hyperactive.        Physical Exam     Initial Vitals [11/02/20 1247]   BP Pulse Resp Temp SpO2   (!) 176/101 104 18 98 °F (36.7 °C) 100 %      MAP       --         Physical Exam    Nursing note and vitals reviewed.  Constitutional: He appears well-developed and well-nourished. He is not diaphoretic. No distress.   HENT:   Head: Normocephalic and atraumatic.   Eyes: EOM are normal. Pupils are equal, round, and reactive to light.   Neck: Normal range of motion. Neck supple.   Cardiovascular: Normal rate, regular rhythm, normal heart sounds and intact distal pulses. Exam reveals no gallop and no friction rub.    No murmur heard.  No lower extremity edema, erythema or warmth   Pulmonary/Chest: Breath sounds normal. No respiratory distress. He has no wheezes. He has no rales. He exhibits no tenderness.   Musculoskeletal: Normal range of motion.   Neurological: He is alert and oriented to person, place, and time. He has normal strength. No sensory deficit.   Skin: Skin is warm and dry.   Psychiatric: He has a normal mood and affect. His speech is normal and behavior is normal. Judgment normal. He is not agitated, not aggressive, not hyperactive, not slowed, not withdrawn, not actively hallucinating and not combative. Thought content is paranoid. Thought content is not delusional. He expresses no homicidal and no suicidal ideation. He expresses no suicidal plans and no homicidal plans. He is attentive.         ED Course   Procedures  Labs Reviewed   ISTAT PROCEDURE          Imaging Results          X-Ray Lumbar Spine Ap And Lateral (Final result)  Result time 11/02/20 15:29:45    Final result by Reece Moore MD  (11/02/20 15:29:45)                 Impression:      1. Sclerotic change involving the L2 vertebral body noting possible increased height loss as compared to the previous examination on lateral view however this is not confirmed on AP view.  This may reflect that of positional change however correlation with any focal tenderness is recommended.  2. Somewhat sclerotic appearance of the left osseous hemipelvis, correlation with any history of Paget disease recommended.      Electronically signed by: Reece Moore MD  Date:    11/02/2020  Time:    15:29             Narrative:    EXAMINATION:  XR LUMBAR SPINE AP AND LATERAL    CLINICAL HISTORY:  Back pain or radiculopathy, > 6 wks;Hx Paget's disease;    TECHNIQUE:  AP, lateral and spot images were performed of the lumbar spine.    COMPARISON:  10/13/2018    FINDINGS:  Three views lumbar spine.    Lateral imaging demonstrates adequate alignment of the lumbar spine.  There is disc space height loss at L5-S1.  There is sclerotic change involving L2, noting there may be slight interval height loss involving L2 as compared to the previous examination.  The facet joints are aligned noting multilevel facet arthropathy.  The sacral segments appear aligned.  AP spinal alignment is unremarkable.  The bilateral sacroiliac joints appear intact.  There is somewhat sclerotic appearance of the left osseous hemipelvis, correlation with any history of Paget disease recommended.                               X-Ray Ankle Complete Bilateral (Final result)  Result time 11/02/20 14:56:12   Procedure changed from X-Ray Ankle 2 View Bilateral     Final result by Satinder Ag MD (11/02/20 14:56:12)                 Impression:      No acute bony abnormality.      Electronically signed by: Satinder Ag MD  Date:    11/02/2020  Time:    14:56             Narrative:    EXAMINATION:  XR ANKLE COMPLETE 3 VIEW BILATERAL    CLINICAL HISTORY:  ankle swelling;    TECHNIQUE:  AP, lateral and  oblique views of both ankles were performed.    COMPARISON:  None.    FINDINGS:  Right ankle: No evidence of acute fracture or dislocation.  Enthesopathic changes noted at the Achilles insertion.  Small plantar calcaneal spur is present.  Mild degenerative changes in the midfoot.  Soft tissues are symmetric.  No radiopaque foreign body.    Left ankle: No evidence of acute fracture or dislocation.  Enthesopathic changes noted at the Achilles tendon insertion.  Mild degenerative changes noted in the midfoot.  Soft tissues are symmetric.  No radiopaque foreign body.                                 Medical Decision Making:   History:   I obtained history from: someone other than patient.       <> Summary of History: I discussed this patient with his daughter with whom he presents.  I asked her if she has any concerns about her father's mental state but denies having any concerns about his safety or behavior at home.  Old Medical Records: I decided to obtain old medical records.  Initial Assessment:   60-year-old male presenting for leg swelling and ankle pain after being initiated on amlodipine for elevated blood pressure.  He is hypertensive 176/101 with otherwise normal vitals.  Well-appearing.  I do not appreciate any significant swelling in his calves or ankles on exam.  Of note, the patient made some paranoid sounding comments to RN asking about the FBI following him.  On my assessment, patient has an odd affect but is not actively hallucinating and not displaying any concerning behavior.  I discussed with his daughter if she has any concerns and she states that this is his baseline.  Although patient does seem to have some level of paranoia, I do not believe that he is gravely disabled.  Differential Diagnosis:   Although I do not appreciate significant leg swelling on exam, suspect this is side effect of amlodipine  Renal dysfunction  No shortness of breath to suggest CHF  Arthritis  Suspect back pain due to  muscle strain/spasm versus Paget's disease  Independently Interpreted Test(s):   I have ordered and independently interpreted X-rays - see summary below.       <> Summary of X-Ray Reading(s): No acute fracture  Clinical Tests:   Lab Tests: Ordered and Reviewed  Radiological Study: Ordered and Reviewed  ED Management:  Will check labs, do x-rays, give Tylenol for pain and reassess.    Patient reports improvement of pain after Tylenol.  Labs are unremarkable.  X-ray of the spine shows sclerotic change which may be related to his Paget's disease.  Ankle x-rays are normal.  I explained to the patient I suspect the swelling is due to amlodipine.  Advised him to follow up with PCP regarding blood pressure medication and patches disease and return to the ED for any worsening symptoms. Stressed the importance of follow-up, strict ED return precautions given.  Patient voiced understanding and is comfortable with discharge. I discussed this patient with my supervising physician.                     ED Course as of Nov 02 1926   Mon Nov 02, 2020   1527 ISTAT PROCEDURE [CC]      ED Course User Index  [CC] Lesley Wray PA-C            Clinical Impression:     ICD-10-CM ICD-9-CM   1. Leg swelling  M79.89 729.81   2. Paget's disease of bone  M88.9 731.0   3. Chronic midline low back pain without sciatica  M54.5 724.2    G89.29 338.29                      Disposition:   Disposition: Discharged  Condition: Stable     ED Disposition Condition    Discharge Stable        ED Prescriptions     None        Follow-up Information     Follow up With Specialties Details Why Contact Info Additional Information    [facility] Schedule an appointment as soon as possible for a visit in 1 week  [id number]  [facility] [id number] [phone] [facility] Please park in surface lot and check in at central registration desk    [facility]  [facility] Emergency Medicine Go to  If symptoms worsen [address]  [facility] [id number]  [phone]                                        Lesley Wray PA-C  11/02/20 1928

## 2020-11-02 NOTE — DISCHARGE INSTRUCTIONS
Diagnosis: Leg swelling, ankle pain, back pain, Paget's disease    Suspect that the swelling in your legs is caused by your blood pressure medicine amlodipine.  Your lab tests here look normal, x-rays of her ankles are normal.  X-ray of your back did show changes consistent with Paget's disease. You should take Tylenol as needed for pain up to 3 grams daily which is 6 of the 500 mg extra strength tablets.    Please schedule a follow-up appointment with your primary care doctor in the next 1-2 weeks to discuss a different blood pressure medicine as well as her Paget's disease.  If you have worsening symptoms or new symptoms such as weakness or numbness please return to the emergency department.

## 2020-11-02 NOTE — ED NOTES
I-STAT Chem-8+ Results:   Value Reference Range   Sodium 141 136-145 mmol/L   Potassium  4.2 3.5-5.1 mmol/L   Chloride 102  mmol/L   Ionized Calcium 1.17 1.06-1.42 mmol/L   CO2 (measured) 27 23-29 mmol/L   Glucose 110  mg/dL   BUN 26 6-30 mg/dL   Creatinine 1.1 0.5-1.4 mg/dL   Hematocrit 45 36-54%

## 2020-11-02 NOTE — ED TRIAGE NOTES
"Pt reports to the ED for ankle swelling, Pt exhibiting paranoia stating, "The FBI is following me, I cant speak, you must speak to her." Pt denies SI/HI. Pt's friend states, "He hasnt been right since he got discharged from behavioral health.    "

## 2021-04-16 ENCOUNTER — PATIENT MESSAGE (OUTPATIENT)
Dept: RESEARCH | Facility: HOSPITAL | Age: 61
End: 2021-04-16

## 2021-06-04 ENCOUNTER — HOSPITAL ENCOUNTER (EMERGENCY)
Facility: HOSPITAL | Age: 61
Discharge: HOME | End: 2021-06-04
Attending: EMERGENCY MEDICINE
Payer: MEDICAID

## 2021-06-04 VITALS
RESPIRATION RATE: 16 BRPM | BODY MASS INDEX: 22.44 KG/M2 | WEIGHT: 156.75 LBS | HEIGHT: 70 IN | DIASTOLIC BLOOD PRESSURE: 87 MMHG | OXYGEN SATURATION: 100 % | TEMPERATURE: 98 F | SYSTOLIC BLOOD PRESSURE: 143 MMHG | HEART RATE: 87 BPM

## 2021-06-04 DIAGNOSIS — L73.9 FOLLICULITIS: ICD-10-CM

## 2021-06-04 DIAGNOSIS — Z87.39 HISTORY OF PAGET'S DISEASE OF BONE: Primary | ICD-10-CM

## 2021-06-04 DIAGNOSIS — M54.42 CHRONIC LOW BACK PAIN WITH BILATERAL SCIATICA, UNSPECIFIED BACK PAIN LATERALITY: ICD-10-CM

## 2021-06-04 DIAGNOSIS — G89.29 CHRONIC LOW BACK PAIN WITH BILATERAL SCIATICA, UNSPECIFIED BACK PAIN LATERALITY: ICD-10-CM

## 2021-06-04 DIAGNOSIS — R52 PAIN: ICD-10-CM

## 2021-06-04 DIAGNOSIS — M54.41 CHRONIC LOW BACK PAIN WITH BILATERAL SCIATICA, UNSPECIFIED BACK PAIN LATERALITY: ICD-10-CM

## 2021-06-04 PROCEDURE — 99282 EMERGENCY DEPT VISIT SF MDM: CPT | Mod: ,,, | Performed by: EMERGENCY MEDICINE

## 2021-06-04 PROCEDURE — 99282 PR EMERGENCY DEPT VISIT,LEVEL II: ICD-10-PCS | Mod: ,,, | Performed by: EMERGENCY MEDICINE

## 2021-06-04 PROCEDURE — 25000003 PHARM REV CODE 250: Performed by: EMERGENCY MEDICINE

## 2021-06-04 PROCEDURE — 99284 EMERGENCY DEPT VISIT MOD MDM: CPT | Mod: 25

## 2021-06-04 RX ORDER — SULFAMETHOXAZOLE AND TRIMETHOPRIM 800; 160 MG/1; MG/1
1 TABLET ORAL 2 TIMES DAILY
Qty: 14 TABLET | Refills: 0 | Status: SHIPPED | OUTPATIENT
Start: 2021-06-04 | End: 2021-06-11

## 2021-06-04 RX ORDER — ACETAMINOPHEN 325 MG/1
650 TABLET ORAL
Status: COMPLETED | OUTPATIENT
Start: 2021-06-04 | End: 2021-06-04

## 2021-06-04 RX ORDER — IBUPROFEN 600 MG/1
600 TABLET ORAL
Status: COMPLETED | OUTPATIENT
Start: 2021-06-04 | End: 2021-06-04

## 2021-06-04 RX ORDER — MELOXICAM 15 MG/1
15 TABLET ORAL DAILY
Qty: 14 TABLET | Refills: 0 | Status: ON HOLD | OUTPATIENT
Start: 2021-06-04 | End: 2023-10-03 | Stop reason: HOSPADM

## 2021-06-04 RX ORDER — KETOROLAC TROMETHAMINE 30 MG/ML
10 INJECTION, SOLUTION INTRAMUSCULAR; INTRAVENOUS
Status: DISCONTINUED | OUTPATIENT
Start: 2021-06-04 | End: 2021-06-04

## 2021-06-04 RX ADMIN — IBUPROFEN 600 MG: 600 TABLET, FILM COATED ORAL at 12:06

## 2021-06-04 RX ADMIN — ACETAMINOPHEN 650 MG: 325 TABLET ORAL at 12:06

## 2021-11-04 ENCOUNTER — IMMUNIZATION (OUTPATIENT)
Dept: INTERNAL MEDICINE | Facility: CLINIC | Age: 61
End: 2021-11-04
Payer: MEDICAID

## 2021-11-04 DIAGNOSIS — Z23 NEED FOR VACCINATION: Primary | ICD-10-CM

## 2021-11-04 PROCEDURE — 0031A COVID-19,VECTOR-NR,RS-AD26,PF,0.5 ML DOSE VACCINE (JANSSEN): CPT | Mod: PBBFAC,CV19

## 2022-10-03 NOTE — ED TRIAGE NOTES
Pt presents stating people have been following him. He tried contacting the police, FBI, attorneys with no success. Pt tearful and reports feeling scared.     Pt's aunt states pt had similar psychiatric outbreak 9 years ago when mother . Pt has been experiencing symptoms for weeks and recently told to take a leave of from work.  
40 or less

## 2023-09-08 ENCOUNTER — HOSPITAL ENCOUNTER (EMERGENCY)
Facility: HOSPITAL | Age: 63
Discharge: HOME | End: 2023-09-08
Attending: EMERGENCY MEDICINE
Payer: MEDICAID

## 2023-09-08 VITALS
WEIGHT: 191.13 LBS | SYSTOLIC BLOOD PRESSURE: 185 MMHG | BODY MASS INDEX: 27.43 KG/M2 | OXYGEN SATURATION: 99 % | RESPIRATION RATE: 18 BRPM | HEART RATE: 97 BPM | TEMPERATURE: 98 F | DIASTOLIC BLOOD PRESSURE: 93 MMHG

## 2023-09-08 DIAGNOSIS — R05.9 COUGH: ICD-10-CM

## 2023-09-08 DIAGNOSIS — E83.51 HYPOCALCEMIA: ICD-10-CM

## 2023-09-08 DIAGNOSIS — R53.1 GENERALIZED WEAKNESS: ICD-10-CM

## 2023-09-08 DIAGNOSIS — R06.02 SHORTNESS OF BREATH: ICD-10-CM

## 2023-09-08 DIAGNOSIS — I10 HYPERTENSION, UNSPECIFIED TYPE: ICD-10-CM

## 2023-09-08 DIAGNOSIS — U07.1 COVID-19: Primary | ICD-10-CM

## 2023-09-08 DIAGNOSIS — R31.29 MICROSCOPIC HEMATURIA: ICD-10-CM

## 2023-09-08 LAB
ALBUMIN SERPL BCP-MCNC: 3.4 G/DL (ref 3.5–5.2)
ALP SERPL-CCNC: 97 U/L (ref 55–135)
ALT SERPL W/O P-5'-P-CCNC: 21 U/L (ref 10–44)
ANION GAP SERPL CALC-SCNC: 11 MMOL/L (ref 8–16)
AST SERPL-CCNC: 44 U/L (ref 10–40)
BACTERIA #/AREA URNS AUTO: NORMAL /HPF
BASOPHILS # BLD AUTO: 0.03 K/UL (ref 0–0.2)
BASOPHILS NFR BLD: 0.6 % (ref 0–1.9)
BILIRUB SERPL-MCNC: 0.4 MG/DL (ref 0.1–1)
BILIRUB UR QL STRIP: NEGATIVE
BNP SERPL-MCNC: <10 PG/ML (ref 0–99)
BUN SERPL-MCNC: 7 MG/DL (ref 8–23)
CALCIUM SERPL-MCNC: 8.3 MG/DL (ref 8.7–10.5)
CHLORIDE SERPL-SCNC: 110 MMOL/L (ref 95–110)
CLARITY UR REFRACT.AUTO: CLEAR
CO2 SERPL-SCNC: 19 MMOL/L (ref 23–29)
COLOR UR AUTO: ABNORMAL
CREAT SERPL-MCNC: 0.9 MG/DL (ref 0.5–1.4)
D DIMER PPP IA.FEU-MCNC: 0.48 MG/L FEU
DIFFERENTIAL METHOD: ABNORMAL
EOSINOPHIL # BLD AUTO: 0.1 K/UL (ref 0–0.5)
EOSINOPHIL NFR BLD: 1.7 % (ref 0–8)
ERYTHROCYTE [DISTWIDTH] IN BLOOD BY AUTOMATED COUNT: 16.5 % (ref 11.5–14.5)
EST. GFR  (NO RACE VARIABLE): >60 ML/MIN/1.73 M^2
GLUCOSE SERPL-MCNC: 97 MG/DL (ref 70–110)
GLUCOSE UR QL STRIP: NEGATIVE
HCT VFR BLD AUTO: 43.7 % (ref 40–54)
HGB BLD-MCNC: 13.7 G/DL (ref 14–18)
HGB UR QL STRIP: ABNORMAL
IMM GRANULOCYTES # BLD AUTO: 0.03 K/UL (ref 0–0.04)
IMM GRANULOCYTES NFR BLD AUTO: 0.6 % (ref 0–0.5)
KETONES UR QL STRIP: NEGATIVE
LEUKOCYTE ESTERASE UR QL STRIP: NEGATIVE
LYMPHOCYTES # BLD AUTO: 2.1 K/UL (ref 1–4.8)
LYMPHOCYTES NFR BLD: 44.9 % (ref 18–48)
MAGNESIUM SERPL-MCNC: 2.1 MG/DL (ref 1.6–2.6)
MCH RBC QN AUTO: 27 PG (ref 27–31)
MCHC RBC AUTO-ENTMCNC: 31.4 G/DL (ref 32–36)
MCV RBC AUTO: 86 FL (ref 82–98)
MICROSCOPIC COMMENT: NORMAL
MONOCYTES # BLD AUTO: 0.3 K/UL (ref 0.3–1)
MONOCYTES NFR BLD: 6 % (ref 4–15)
NEUTROPHILS # BLD AUTO: 2.2 K/UL (ref 1.8–7.7)
NEUTROPHILS NFR BLD: 46.2 % (ref 38–73)
NITRITE UR QL STRIP: NEGATIVE
NRBC BLD-RTO: 0 /100 WBC
PH UR STRIP: 6 [PH] (ref 5–8)
PLATELET # BLD AUTO: 267 K/UL (ref 150–450)
PMV BLD AUTO: 9.5 FL (ref 9.2–12.9)
POTASSIUM SERPL-SCNC: 4.9 MMOL/L (ref 3.5–5.1)
PROCALCITONIN SERPL IA-MCNC: 0.03 NG/ML
PROT SERPL-MCNC: 7.2 G/DL (ref 6–8.4)
PROT UR QL STRIP: NEGATIVE
RBC # BLD AUTO: 5.07 M/UL (ref 4.6–6.2)
RBC #/AREA URNS AUTO: 1 /HPF (ref 0–4)
SODIUM SERPL-SCNC: 140 MMOL/L (ref 136–145)
SP GR UR STRIP: 1 (ref 1–1.03)
TROPONIN I SERPL DL<=0.01 NG/ML-MCNC: <0.006 NG/ML (ref 0–0.03)
TSH SERPL DL<=0.005 MIU/L-ACNC: 0.49 UIU/ML (ref 0.4–4)
URN SPEC COLLECT METH UR: ABNORMAL
WBC # BLD AUTO: 4.7 K/UL (ref 3.9–12.7)
WBC #/AREA URNS AUTO: 1 /HPF (ref 0–5)

## 2023-09-08 PROCEDURE — 81001 URINALYSIS AUTO W/SCOPE: CPT | Performed by: EMERGENCY MEDICINE

## 2023-09-08 PROCEDURE — 25000003 PHARM REV CODE 250: Performed by: EMERGENCY MEDICINE

## 2023-09-08 PROCEDURE — 93010 ELECTROCARDIOGRAM REPORT: CPT | Mod: ,,, | Performed by: INTERNAL MEDICINE

## 2023-09-08 PROCEDURE — 84484 ASSAY OF TROPONIN QUANT: CPT | Performed by: EMERGENCY MEDICINE

## 2023-09-08 PROCEDURE — 85025 COMPLETE CBC W/AUTO DIFF WBC: CPT | Performed by: EMERGENCY MEDICINE

## 2023-09-08 PROCEDURE — 84443 ASSAY THYROID STIM HORMONE: CPT | Performed by: EMERGENCY MEDICINE

## 2023-09-08 PROCEDURE — 96360 HYDRATION IV INFUSION INIT: CPT

## 2023-09-08 PROCEDURE — 93005 ELECTROCARDIOGRAM TRACING: CPT

## 2023-09-08 PROCEDURE — 83880 ASSAY OF NATRIURETIC PEPTIDE: CPT | Performed by: EMERGENCY MEDICINE

## 2023-09-08 PROCEDURE — 85379 FIBRIN DEGRADATION QUANT: CPT | Performed by: EMERGENCY MEDICINE

## 2023-09-08 PROCEDURE — 80053 COMPREHEN METABOLIC PANEL: CPT | Performed by: EMERGENCY MEDICINE

## 2023-09-08 PROCEDURE — 84145 PROCALCITONIN (PCT): CPT | Performed by: EMERGENCY MEDICINE

## 2023-09-08 PROCEDURE — 83735 ASSAY OF MAGNESIUM: CPT | Performed by: EMERGENCY MEDICINE

## 2023-09-08 PROCEDURE — 93010 EKG 12-LEAD: ICD-10-PCS | Mod: ,,, | Performed by: INTERNAL MEDICINE

## 2023-09-08 PROCEDURE — 99285 EMERGENCY DEPT VISIT HI MDM: CPT | Mod: 25

## 2023-09-08 RX ORDER — CODEINE PHOSPHATE AND GUAIFENESIN 10; 100 MG/5ML; MG/5ML
5 SOLUTION ORAL 3 TIMES DAILY PRN
Qty: 105 ML | Refills: 0 | Status: SHIPPED | OUTPATIENT
Start: 2023-09-08 | End: 2023-09-15

## 2023-09-08 RX ADMIN — SODIUM CHLORIDE 1000 ML: 9 INJECTION, SOLUTION INTRAVENOUS at 04:09

## 2023-09-08 NOTE — DISCHARGE INSTRUCTIONS
Your blood pressure was elevated in the emergency department.  You must follow-up with your primary care doctor for adjustment of your medicine.    Tests today showed:   Labs Reviewed   CBC W/ AUTO DIFFERENTIAL - Abnormal; Notable for the following components:       Result Value    Hemoglobin 13.7 (*)     MCHC 31.4 (*)     RDW 16.5 (*)     Immature Granulocytes 0.6 (*)     All other components within normal limits   URINALYSIS, REFLEX TO URINE CULTURE - Abnormal; Notable for the following components:    Occult Blood UA 1+ (*)     All other components within normal limits    Narrative:     Specimen Source->Urine   B-TYPE NATRIURETIC PEPTIDE   D DIMER, QUANTITATIVE   TROPONIN I   URINALYSIS MICROSCOPIC    Narrative:     Specimen Source->Urine   COMPREHENSIVE METABOLIC PANEL   MAGNESIUM   TSH   PROCALCITONIN     X-Ray Chest PA And Lateral   Final Result      No acute cardiopulmonary process.         Electronically signed by: Brijesh Martínez MD   Date:    09/08/2023   Time:    03:44      X-Ray Lumbar Spine Ap And Lateral   Final Result      Stable chronic sclerotic changes and height loss at L2 possibly related to Paget's disease, correlate clinically.      Remaining lumbar vertebral body heights appear intact with no acute compression fractures noted.      Chronic multilevel degenerative changes, similar to prior.         Electronically signed by: Brijesh Martínez MD   Date:    09/08/2023   Time:    03:48          Treatments you had today:   Medications   sodium chloride 0.9% bolus 1,000 mL 1,000 mL (has no administration in time range)       Follow-Up Plan:  - Follow-up with primary care doctor within 3 - 5 days  - Additional testing and/or evaluation as directed by your primary doctor    Return to the Emergency Department for symptoms including but not limited to: worsening symptoms, shortness of breath or chest pain, vomiting with inability to hold down fluids, fevers greater than 100.4°F, passing  out/fainting/unconsciousness, or other concerning symptoms.

## 2023-09-08 NOTE — ED NOTES
Thaddeus Moss, a 63 y.o. male presents to the ED w/ complaint of coughing, covid positive since Saturday. Patient feels lethargic and restless, also suffers from hypertension has not been taking his blood pressure medication.       Triage note:  Chief Complaint   Patient presents with    Cough     Low appetite, fatigue, weak, lethargic, fever(t 98.2), non productive cough for weeks.      COVID-19 Concerns     Covid +, seen in Urgent Care received tessalon pearls and medication.  Patient states he's not taking his blood pressure medication under his own directive     Review of patient's allergies indicates:  No Known Allergies  Past Medical History:   Diagnosis Date    Paget disease of bone

## 2023-09-08 NOTE — ED PROVIDER NOTES
Source of History:  Patient  Chart    Chief complaint:  Cough (Low appetite, fatigue, weak, lethargic, fever(t 98.2), non productive cough for weeks.  ) and COVID-19 Concerns (Covid +, seen in Urgent Care received tessalon pearls and medication.  Patient states he's not taking his blood pressure medication under his own directive)      HPI:  Thaddeus Moss is a 63 y.o. male with history of HTN, Paget's disease of bone presenting to emergency department with complaint of cough.  Patient states that he was diagnosed with COVID-19 6 days ago.  Was prescribed Paxlovid, which he completed.  He is also been taking Tessalon Perles.  Stopped taking his antihypertensive medication several days ago of his own volition.      He states that he is an ongoing cough that is not productive of sputum.  In general, he feels fatigued.  He complains of subjective fever.  Noticed intermittent twitching and cramping of his legs.  Also has some new pain in his low back.    Review of patient's allergies indicates:  No Known Allergies    No current facility-administered medications on file prior to encounter.     Current Outpatient Medications on File Prior to Encounter   Medication Sig Dispense Refill    amLODIPine (NORVASC) 5 MG tablet Take 1 tablet (5 mg total) by mouth once daily. 30 tablet 3    meloxicam (MOBIC) 15 MG tablet Take 1 tablet (15 mg total) by mouth once daily. 14 tablet 0       PMH:  As per HPI and below:  Past Medical History:   Diagnosis Date    Paget disease of bone      No past surgical history on file.    Social History     Socioeconomic History    Marital status:    Tobacco Use    Smoking status: Former     Types: Cigarettes    Smokeless tobacco: Never    Tobacco comments:     Pt started smoking daily again this week after quitting for 6 years   Substance and Sexual Activity    Alcohol use: Yes     Comment: daily past week    Drug use: No    Sexual activity: Yes   Social History Narrative    - exercise  infrequently       Family History   Problem Relation Age of Onset    Diabetes Mother     Diabetes Father     Hypertension Sister     Hypertension Brother     Hypertension Sister        Physical Exam:      Vitals:    09/08/23 0013   BP: (!) 181/100   Pulse: 103   Resp: 18   Temp: 98.2 °F (36.8 °C)     Gen: No acute distress.  Nontoxic.  Well appearing.  Mental Status:  Alert and oriented.  Appropriate, conversant.  Skin: Warm, dry. No rashes seen.  Eyes: No conjunctival injection.  Pulm: CTAB. No increased work of breathing.  No significant tachypnea.  No audible stridor or wheezing.  No conversational dyspnea.    CV: Regular rate. Regular rhythm.  No lower extremity edema.  No JVD.  Abd: Soft.  Not distended.  Nontender.   MSK: Good range of motion all joints.  No deformities.  No midline spinal tenderness.  Neuro: Awake. Speech normal. No focal neuro deficit observed.    Laboratory Studies:  Labs Reviewed   CBC W/ AUTO DIFFERENTIAL - Abnormal; Notable for the following components:       Result Value    Hemoglobin 13.7 (*)     MCHC 31.4 (*)     RDW 16.5 (*)     Immature Granulocytes 0.6 (*)     All other components within normal limits   URINALYSIS, REFLEX TO URINE CULTURE - Abnormal; Notable for the following components:    Occult Blood UA 1+ (*)     All other components within normal limits    Narrative:     Specimen Source->Urine   COMPREHENSIVE METABOLIC PANEL - Abnormal; Notable for the following components:    CO2 19 (*)     BUN 7 (*)     Calcium 8.3 (*)     Albumin 3.4 (*)     AST 44 (*)     All other components within normal limits   B-TYPE NATRIURETIC PEPTIDE   D DIMER, QUANTITATIVE   TROPONIN I   PROCALCITONIN   URINALYSIS MICROSCOPIC    Narrative:     Specimen Source->Urine   MAGNESIUM   TSH       EKG (independently interpreted by me):  Normal sinus rhythm, rate 89.  No STEMI.  QRS 84 milliseconds.   milliseconds    X-rays (independently interpreted by me):  No pneumonia, cardiomegaly, pulmonary  edema, pneumothorax.    Chart reviewed.     Imaging Results              X-Ray Chest PA And Lateral (Final result)  Result time 09/08/23 03:44:19      Final result by Brijesh Martínez MD (09/08/23 03:44:19)                   Impression:      No acute cardiopulmonary process.      Electronically signed by: Brijesh Martínez MD  Date:    09/08/2023  Time:    03:44               Narrative:    EXAMINATION:  XR CHEST PA AND LATERAL    CLINICAL HISTORY:  Cough, unspecified    TECHNIQUE:  PA and lateral views of the chest were performed.    COMPARISON:  03/21/2019.    FINDINGS:  There is no consolidation, effusion, or pneumothorax.    Cardiomediastinal silhouette is unremarkable.    Regional osseous structures are unremarkable.                                       X-Ray Lumbar Spine Ap And Lateral (Final result)  Result time 09/08/23 03:48:13      Final result by Brijesh Martínez MD (09/08/23 03:48:13)                   Impression:      Stable chronic sclerotic changes and height loss at L2 possibly related to Paget's disease, correlate clinically.    Remaining lumbar vertebral body heights appear intact with no acute compression fractures noted.    Chronic multilevel degenerative changes, similar to prior.      Electronically signed by: Brijesh Martínez MD  Date:    09/08/2023  Time:    03:48               Narrative:    EXAMINATION:  XR LUMBAR SPINE AP AND LATERAL    CLINICAL HISTORY:  low back pain;    TECHNIQUE:  AP, lateral and spot images were performed of the lumbar spine.    COMPARISON:  06/04/2021.    FINDINGS:  Alignment: Alignment is similar to prior.No spondylolisthesis.  Straightening of normal lordosis.    Vertebrae: Sclerotic changes and height loss involving L2, unchanged from prior.  Remaining lumbar vertebral body heights appear maintained.    Discs and facets: Multilevel degenerative disc space narrowing, similar to prior.  Degenerative changes in the lower lumbar facet joints, similar to  prior.    Miscellaneous: No additional findings.                        Final result by Brijesh Martínez MD (09/08/23 03:48:13)                   Impression:      Stable chronic sclerotic changes and height loss at L2 possibly related to Paget's disease, correlate clinically.    Remaining lumbar vertebral body heights appear intact with no acute compression fractures noted.    Chronic multilevel degenerative changes, similar to prior.      Electronically signed by: Brijesh Martínez MD  Date:    09/08/2023  Time:    03:48               Narrative:    EXAMINATION:  XR LUMBAR SPINE AP AND LATERAL    CLINICAL HISTORY:  low back pain;    TECHNIQUE:  AP, lateral and spot images were performed of the lumbar spine.    COMPARISON:  06/04/2021.    FINDINGS:  Alignment: Alignment is similar to prior.No spondylolisthesis.  Straightening of normal lordosis.    Vertebrae: Sclerotic changes and height loss involving L2, unchanged from prior.  Remaining lumbar vertebral body heights appear maintained.    Discs and facets: Multilevel degenerative disc space narrowing, similar to prior.  Degenerative changes in the lower lumbar facet joints, similar to prior.    Miscellaneous: No additional findings.                                      Medications Given:  Medications   sodium chloride 0.9% bolus 1,000 mL 1,000 mL (1,000 mLs Intravenous New Bag 9/8/23 0425)       MDM:    63 y.o. male with complaint of ongoing cough as well as weakness in the setting of recent COVID diagnosis.  Here he is afebrile and hemodynamically stable.      He was found to be hypertensive, recommending that he restart his antihypertensive medications.  He would like information about a new primary care doctor which I have provided.      I obtained labs, EKG, chest x-ray.  EKG without acute ischemic change.  Chest x-ray without acute abnormality.  Labs reviewed.  No leukocytosis, acute anemia.  Negative D-dimer, not consistent with pulmonary embolism.  Normal  troponin.  CMP was remarkable for some hemolysis affecting the AST.  He has mild hypocalcemia, recommend primary care follow-up for this.  Urinalysis with microscopic hematuria, again will follow up with primary care.  I obtained an x-ray of the lumbar spine which is normal.    On reassessment, calm, comfortable.  The cough is not bronchospastic, do not feel he would benefit from bronchodilators.  This is his 7th day of illness, not consistent with long COVID.  Suspect postviral cough.  Low concern for superimposed pneumonia.  Discharged home in stable condition.  Prescriptions provided.  Safe use of opioids discussed at bedside.    Diagnostic Impression:    1. COVID-19    2. Hypertension, unspecified type    3. Shortness of breath    4. Cough    5. Generalized weakness    6. Microscopic hematuria    7. Hypocalcemia         ED Disposition Condition    Discharge Stable          ED Prescriptions       Medication Sig Dispense Start Date End Date Auth. Provider    guaiFENesin-codeine 100-10 mg/5 ml (TUSSI-ORGANIDIN NR)  mg/5 mL syrup Take 5 mLs by mouth 3 (three) times daily as needed for Cough. 105 mL 9/8/2023 9/15/2023 Birdie Paredes MD          Follow-up Information       Follow up With Specialties Details Why Contact Info Additional Information    Your primary care doctor  Schedule an appointment as soon as possible for a visit        Yemi Stringer [facility] Primary Care [facility] Internal Medicine Schedule an appointment as soon as possible for a visit   [address]  [facility] [id number] [phone] [facility] Please park in surface lot and check in at central registration desk            Patient understands the plan and is in agreement, verbalized understanding, questions answered    Birdie Paredes MD  Emergency Medicine         Birdie Paredes MD  09/08/23 [id number]

## 2023-09-08 NOTE — Clinical Note
"Thaddeus Morris"Isa was seen and treated in our emergency department on 9/8/2023.  He may return to work on 09/11/2023.       If you have any questions or concerns, please don't hesitate to call.      Birdie Paredes MD"

## 2023-09-14 ENCOUNTER — HOSPITAL ENCOUNTER (EMERGENCY)
Facility: HOSPITAL | Age: 63
Discharge: HOME | End: 2023-09-14
Attending: STUDENT IN AN ORGANIZED HEALTH CARE EDUCATION/TRAINING PROGRAM
Payer: MEDICAID

## 2023-09-14 VITALS
OXYGEN SATURATION: 99 % | HEART RATE: 98 BPM | DIASTOLIC BLOOD PRESSURE: 96 MMHG | SYSTOLIC BLOOD PRESSURE: 161 MMHG | RESPIRATION RATE: 19 BRPM | TEMPERATURE: 99 F

## 2023-09-14 DIAGNOSIS — R53.1 WEAKNESS: ICD-10-CM

## 2023-09-14 DIAGNOSIS — R05.9 COUGH, UNSPECIFIED TYPE: Primary | ICD-10-CM

## 2023-09-14 PROCEDURE — 93010 ELECTROCARDIOGRAM REPORT: CPT | Mod: ,,, | Performed by: INTERNAL MEDICINE

## 2023-09-14 PROCEDURE — 93010 EKG 12-LEAD: ICD-10-PCS | Mod: ,,, | Performed by: INTERNAL MEDICINE

## 2023-09-14 PROCEDURE — 99284 EMERGENCY DEPT VISIT MOD MDM: CPT | Mod: 25

## 2023-09-14 PROCEDURE — 93005 ELECTROCARDIOGRAM TRACING: CPT

## 2023-09-14 NOTE — ED PROVIDER NOTES
Encounter Date: 9/14/2023       History     Chief Complaint   Patient presents with    Cough     Has been seen multiple times in the past week for a cough. COVID + over a week ago and lasting cough, shortness of breath. Now developing generalized weakness. Hx Paget's disease. No chest pain.      63-year-old male with history of Paget's disease, hypertension presents to the ED complaining of persistent cough.  He tested positive for COVID on 09/02, completed a course of Paxlovid.  He was seen in urgent care yesterday and given prescriptions for albuterol and prednisone but has not yet filled these medications.  He is complaining of a productive cough, improved with being outside or chewing gum, fatigue, low back pain, exercise intolerance.  Denies fever, chills, chest pain, nausea, headache, lightheadedness.    The history is provided by the patient.     Review of patient's allergies indicates:  No Known Allergies  Past Medical History:   Diagnosis Date    Paget disease of bone      History reviewed. No pertinent surgical history.  Family History   Problem Relation Age of Onset    Diabetes Mother     Diabetes Father     Hypertension Sister     Hypertension Brother     Hypertension Sister      Social History     Tobacco Use    Smoking status: Former     Types: Cigarettes    Smokeless tobacco: Never    Tobacco comments:     Pt started smoking daily again this week after quitting for 6 years   Substance Use Topics    Alcohol use: Yes     Comment: daily past week    Drug use: No     Review of Systems   Constitutional:  Positive for diaphoresis and fatigue. Negative for fever.   Respiratory:  Positive for shortness of breath. Negative for cough.    Cardiovascular:  Negative for chest pain.   Gastrointestinal:  Negative for abdominal pain, nausea and vomiting.   Musculoskeletal:  Positive for myalgias.   Skin:  Negative for rash.   Neurological:  Negative for light-headedness and headaches.   Psychiatric/Behavioral:   Negative for confusion.        Physical Exam     Initial Vitals [09/14/23 0626]   BP Pulse Resp Temp SpO2   (!) 187/115 (!) 115 18 98.9 °F (37.2 °C) 98 %      MAP       --         Physical Exam    Nursing note and vitals reviewed.  Constitutional: He appears well-developed and well-nourished. He is not diaphoretic. No distress.   Cardiovascular:  Normal rate, regular rhythm and normal heart sounds.     Exam reveals no gallop and no friction rub.       No murmur heard.  Pulmonary/Chest: Breath sounds normal. He has no wheezes. He has no rhonchi. He has no rales. He exhibits no tenderness.   Abdominal: Abdomen is soft. Bowel sounds are normal. There is no abdominal tenderness. There is no rebound and no guarding.   Musculoskeletal:         General: Normal range of motion.     Neurological: He is alert and oriented to person, place, and time.   Skin: Skin is warm and dry.   Psychiatric: He has a normal mood and affect.         ED Course   Procedures  Labs Reviewed - No data to display     ECG Results              EKG 12-lead (Weakness) Age > 50 (Final result)  Result time 09/14/23 09:49:43      Final result by Interface, Lab In [facility] (09/14/23 09:49:43)                   Narrative:    Test Reason : R53.1,    Vent. Rate : 106 BPM     Atrial Rate : 106 BPM     P-R Int : 166 ms          QRS Dur : 078 ms      QT Int : 368 ms       P-R-T Axes : 075 -53 036 degrees     QTc Int : 488 ms    Sinus tachycardia  Possible Left atrial enlargement  Left axis deviation  Low anterior forces  Abnormal ECG  When compared with ECG of 08-SEP-2023 03:16,  Fusion complexes are no longer Present  Confirmed by Shady SINHA MD ([id number]) on 9/14/2023 9:49:31 AM    Referred By: [email or web address]   SELF           Confirmed By:Shady SINHA MD                                  Imaging Results              X-Ray Chest PA And Lateral (Final result)  Result time 09/14/23 08:19:16      Final result by Bill Benton MD (09/14/23 08:19:16)                    Impression:      No acute cardiopulmonary disease      Electronically signed by: Bill Benton MD  Date:    09/14/2023  Time:    08:19               Narrative:    EXAMINATION:  XR CHEST PA AND LATERAL    CLINICAL HISTORY:  cough;    TECHNIQUE:  PA and lateral views of the chest were performed.    COMPARISON:  09/08/2023    FINDINGS:  The heart size is normal.  Mediastinum shows aortic atherosclerosis.  Lungs are expanded and clear without acute consolidation or pleural effusion.  Nipple shadow also faintly seen on the PA view.  The skeletal structures are intact.                                       Medications - No data to display  Medical Decision Making  Chart review:  9/2 urgent Care, COVID positive.  Given prescription for Paxlovid  9/8 ED, tachycardic.  Workup including D-dimer and chest x-ray unremarkable.  9/13 urgent Care, given prescription for albuterol and prednisone.    Amount and/or Complexity of Data Reviewed  Radiology: ordered.         APC / Resident Notes:   63-year-old male with history of Paget's disease, hypertension presents to the ED complaining of persistent cough.  Tachycardic, hypertensive upon presentation to the ED.  He does have a prescription for blood pressure medication but is not taking it.  He was worked up in the ED 6 days ago for the same complaint, had a negative D-dimer at that time.  I considered, but do not suspect PE, denies any chest pain, no hypoxia.  Differential diagnosis includes but isn't limited to persistent viral cough, pneumonia, long COVID.  Will get chest x-ray.    CXR independently reviewed - no acute abnormality.     Tachycardia resolved in the ED without any intervention. I do not feel that he needs any further labs or imaging at this time. Stable for discharge.    He was discharged without any new prescriptions - will fill albuterol and prednisone prescribed by urgent care yesterday.  He will follow up with PCP.  Strict ED return precautions given.  All  of the patient's questions were answered.  I reviewed the patient's chart and imaging.                         Clinical Impression:   Final diagnoses:  [R53.1] Weakness  [R05.9] Cough, unspecified type (Primary)        ED Disposition Condition    Discharge Stable          ED Prescriptions    None       Follow-up Information       Follow up With Specialties Details Why Contact Info Additional Information    [facility] Internal Medicine   [address]  [facility] [id number] [phone] [facility] Please park in surface lot and check in at central registration desk    Yemi Stringer - Pulmonary S[facility] 9[facility] Pulmonology   [address]  [facility] [id number] [phone] Main Building, 9th Floor Please park in [place] [facility] and use Clinic elevator             Analy Duran PA-C  09/14/23 0284

## 2023-09-14 NOTE — ED NOTES
"Patient back to room, requested not to leave ER so he does not miss his x ray, states " it doesn't matter, I have all day" Second nurse to take patient to CCR. Security at bedside, CN notified  "

## 2023-09-14 NOTE — ED NOTES
Patient identifiers verified and correct for  Mr Moss  C/C:  Cough SEE NN  APPEARANCE: awake and alert in NAD. PAIN  10/10  SKIN: warm, dry and intact. No breakdown or bruising.  MUSCULOSKELETAL: Patient moving all extremities spontaneously, no obvious swelling or deformities noted. Ambulates independently.  RESPIRATORY: Denies shortness of breath.Respirations unlabored. Positive cough  CARDIAC: Denies CP, 2+ distal pulses; no peripheral edema  ABDOMEN: S/ND/NT, Denies nausea  : voids spontaneously, denies difficulty  Neurologic: AAO x 4; follows commands equal strength in all extremities; denies numbness/tingling. Denies dizziness Denies new weakness

## 2023-09-14 NOTE — DISCHARGE INSTRUCTIONS
Start steroid and inhaler as prescribed by urgent care yesterday. Stay hydrated. Tylenol as needed for pain.          Your test was POSITIVE for COVID-19 (coronavirus) on 9/2/23      Please isolate yourself at home.  You may leave home and/or return to work once the following conditions are met:    If you were not hospitalized and are not moderately to severely immunocompromised:   More than 5 days since symptoms first appeared AND  More than 24 hours fever free without medications AND  Symptoms are improving  Continue to wear a mask around others for 5 additional days.    If you were hospitalized OR are moderately to severely immunocompromised:  More than 20 days since symptoms first appeared  More than 24 hours fever free without medications  Symptoms have improved    If you had no symptoms but tested positive:  More than 5 days since the date of the first positive test (20 days if moderately to severely immunocompromised). If you develop symptoms, then use the guidelines above.  Continue to wear a mask around others for 5 additional days.

## 2023-09-25 ENCOUNTER — HOSPITAL ENCOUNTER (EMERGENCY)
Facility: HOSPITAL | Age: 63
Discharge: PSYCHIATRIC OR SUBSTANCE USE FACILITY | End: 2023-09-25
Attending: EMERGENCY MEDICINE
Payer: MEDICAID

## 2023-09-25 VITALS
OXYGEN SATURATION: 94 % | HEART RATE: 93 BPM | DIASTOLIC BLOOD PRESSURE: 95 MMHG | TEMPERATURE: 98 F | SYSTOLIC BLOOD PRESSURE: 171 MMHG | BODY MASS INDEX: 27.55 KG/M2 | WEIGHT: 192 LBS | RESPIRATION RATE: 18 BRPM

## 2023-09-25 DIAGNOSIS — F23 ACUTE PSYCHOSIS: Primary | ICD-10-CM

## 2023-09-25 LAB
ALBUMIN SERPL BCP-MCNC: 4 G/DL (ref 3.5–5.2)
ALP SERPL-CCNC: 118 U/L (ref 55–135)
ALT SERPL W/O P-5'-P-CCNC: 16 U/L (ref 10–44)
AMPHET+METHAMPHET UR QL: NEGATIVE
ANION GAP SERPL CALC-SCNC: 10 MMOL/L (ref 8–16)
APAP SERPL-MCNC: <3 UG/ML (ref 10–20)
AST SERPL-CCNC: 33 U/L (ref 10–40)
BACTERIA #/AREA URNS AUTO: ABNORMAL /HPF
BARBITURATES UR QL SCN>200 NG/ML: NEGATIVE
BASOPHILS # BLD AUTO: 0.04 K/UL (ref 0–0.2)
BASOPHILS NFR BLD: 0.7 % (ref 0–1.9)
BENZODIAZ UR QL SCN>200 NG/ML: NEGATIVE
BILIRUB SERPL-MCNC: 0.4 MG/DL (ref 0.1–1)
BILIRUB UR QL STRIP: ABNORMAL
BUN SERPL-MCNC: 10 MG/DL (ref 8–23)
BZE UR QL SCN: NEGATIVE
CALCIUM SERPL-MCNC: 9.4 MG/DL (ref 8.7–10.5)
CANNABINOIDS UR QL SCN: NEGATIVE
CHLORIDE SERPL-SCNC: 108 MMOL/L (ref 95–110)
CLARITY UR REFRACT.AUTO: CLEAR
CO2 SERPL-SCNC: 18 MMOL/L (ref 23–29)
COLOR UR AUTO: ABNORMAL
CREAT SERPL-MCNC: 1 MG/DL (ref 0.5–1.4)
CREAT UR-MCNC: 426 MG/DL (ref 23–375)
DIFFERENTIAL METHOD: ABNORMAL
EOSINOPHIL # BLD AUTO: 0 K/UL (ref 0–0.5)
EOSINOPHIL NFR BLD: 0.3 % (ref 0–8)
ERYTHROCYTE [DISTWIDTH] IN BLOOD BY AUTOMATED COUNT: 15.8 % (ref 11.5–14.5)
EST. GFR  (NO RACE VARIABLE): >60 ML/MIN/1.73 M^2
ETHANOL SERPL-MCNC: <10 MG/DL
GLUCOSE SERPL-MCNC: 111 MG/DL (ref 70–110)
GLUCOSE UR QL STRIP: NEGATIVE
HCT VFR BLD AUTO: 45.7 % (ref 40–54)
HGB BLD-MCNC: 14.4 G/DL (ref 14–18)
HGB UR QL STRIP: NEGATIVE
HYALINE CASTS UR QL AUTO: 0 /LPF
IMM GRANULOCYTES # BLD AUTO: 0.01 K/UL (ref 0–0.04)
IMM GRANULOCYTES NFR BLD AUTO: 0.2 % (ref 0–0.5)
KETONES UR QL STRIP: ABNORMAL
LEUKOCYTE ESTERASE UR QL STRIP: NEGATIVE
LYMPHOCYTES # BLD AUTO: 1.7 K/UL (ref 1–4.8)
LYMPHOCYTES NFR BLD: 29.1 % (ref 18–48)
MCH RBC QN AUTO: 27.4 PG (ref 27–31)
MCHC RBC AUTO-ENTMCNC: 31.5 G/DL (ref 32–36)
MCV RBC AUTO: 87 FL (ref 82–98)
METHADONE UR QL SCN>300 NG/ML: NEGATIVE
MICROSCOPIC COMMENT: ABNORMAL
MONOCYTES # BLD AUTO: 0.4 K/UL (ref 0.3–1)
MONOCYTES NFR BLD: 6.7 % (ref 4–15)
NEUTROPHILS # BLD AUTO: 3.8 K/UL (ref 1.8–7.7)
NEUTROPHILS NFR BLD: 63 % (ref 38–73)
NITRITE UR QL STRIP: NEGATIVE
NRBC BLD-RTO: 0 /100 WBC
OPIATES UR QL SCN: NEGATIVE
PCP UR QL SCN>25 NG/ML: NEGATIVE
PH UR STRIP: 5 [PH] (ref 5–8)
PLATELET # BLD AUTO: 224 K/UL (ref 150–450)
PMV BLD AUTO: 9.8 FL (ref 9.2–12.9)
POTASSIUM SERPL-SCNC: 4.1 MMOL/L (ref 3.5–5.1)
PROT SERPL-MCNC: 8.2 G/DL (ref 6–8.4)
PROT UR QL STRIP: ABNORMAL
RBC # BLD AUTO: 5.26 M/UL (ref 4.6–6.2)
RBC #/AREA URNS AUTO: 7 /HPF (ref 0–4)
SODIUM SERPL-SCNC: 136 MMOL/L (ref 136–145)
SP GR UR STRIP: >=1.03 (ref 1–1.03)
SQUAMOUS #/AREA URNS AUTO: 0 /HPF
TOXICOLOGY INFORMATION: ABNORMAL
TSH SERPL DL<=0.005 MIU/L-ACNC: 0.96 UIU/ML (ref 0.4–4)
URN SPEC COLLECT METH UR: ABNORMAL
WBC # BLD AUTO: 5.97 K/UL (ref 3.9–12.7)
WBC #/AREA URNS AUTO: 3 /HPF (ref 0–5)

## 2023-09-25 PROCEDURE — 82077 ASSAY SPEC XCP UR&BREATH IA: CPT | Performed by: EMERGENCY MEDICINE

## 2023-09-25 PROCEDURE — 80307 DRUG TEST PRSMV CHEM ANLYZR: CPT | Performed by: EMERGENCY MEDICINE

## 2023-09-25 PROCEDURE — 80143 DRUG ASSAY ACETAMINOPHEN: CPT | Performed by: EMERGENCY MEDICINE

## 2023-09-25 PROCEDURE — 99285 EMERGENCY DEPT VISIT HI MDM: CPT

## 2023-09-25 PROCEDURE — 80053 COMPREHEN METABOLIC PANEL: CPT | Performed by: EMERGENCY MEDICINE

## 2023-09-25 PROCEDURE — 84443 ASSAY THYROID STIM HORMONE: CPT | Performed by: EMERGENCY MEDICINE

## 2023-09-25 PROCEDURE — 85025 COMPLETE CBC W/AUTO DIFF WBC: CPT | Performed by: EMERGENCY MEDICINE

## 2023-09-25 PROCEDURE — 81001 URINALYSIS AUTO W/SCOPE: CPT | Mod: 59 | Performed by: EMERGENCY MEDICINE

## 2023-09-25 RX ORDER — MIDAZOLAM HYDROCHLORIDE 1 MG/ML
5 INJECTION INTRAMUSCULAR; INTRAVENOUS
Status: DISCONTINUED | OUTPATIENT
Start: 2023-09-25 | End: 2023-09-25 | Stop reason: HOSPADM

## 2023-09-25 NOTE — ED NOTES
Pt's sister at the bedside, [facility]

## 2023-09-25 NOTE — ED NOTES
Patient identifiers for Thaddeus Moss 63 y.o. male checked and correct.  Chief Complaint   Patient presents with    Psychiatric Evaluation       LOC: Patient is awake, alert, and aware of environment with a guarded affect. Patient is oriented x 4. Pt is hyper verbal, grandiose, rambling  APPEARANCE: Patient resting comfortably and in no acute distress. Patient is clean and well groomed, patient's clothing is properly fastened.  HEENT: WDL  SKIN: The skin is warm and dry. Patient has normal skin turgor and moist mucus membranes.   MUSKULOSKELETAL: Patient is moving all extremities well, no obvious deformities noted. Pulses intact.   RESPIRATORY: Airway is open and patent. Respirations are spontaneous and non-labored with normal effort and rate.  CARDIAC: Patient has a tachycardic rate and rhythm 102. No peripheral edema noted.   ABDOMEN: No distention noted. Soft and non-tender upon palpation.  NEUROLOGICAL: pupils 3 mm, PERRL. Facial expression is symmetrical. Hand grasps are equal bilaterally. Normal sensation in all extremities when touched with finger. Denies SI/HI/VH/AH

## 2023-09-25 NOTE — ED NOTES
Security envelope # [id number] with a wallet (no money),. ID, phone. Secured in the [facility] safe.   Shirt, pants, shoes, hat, and cigarettes secured in the belongings closet.

## 2023-09-25 NOTE — ED PROVIDER NOTES
"Chief Complaint   Psychiatric Evaluation      History Of Present Illness   Thaddeus Moss is a 63 y.o. male presenting with bizarre behavior.  Police were called to a home improvement store after the patient was asked to leave by management.  The patient states that he has called 911 in multiple cities in the last day because they are out to get me.  People with influence.   He claims he was at [facility] this morning and was let go because there was nothing wrong.  I am going to give you a bad review because of all the times I have been here."    Independent Historian:  Darian Hayes's office told our EMS nurse that the patient was behaving bizarrely and causing a disturbance.    Review of patient's allergies indicates:   Allergen Reactions    Doxycycline Other (See Comments)       No current facility-administered medications on file prior to encounter.     Current Outpatient Medications on File Prior to Encounter   Medication Sig Dispense Refill    amLODIPine (NORVASC) 5 MG tablet Take 1 tablet (5 mg total) by mouth once daily. 30 tablet 3    hydroCHLOROthiazide (HYDRODIURIL) 12.5 MG Tab Take 12.5 mg by mouth.      hydrOXYzine pamoate (VISTARIL) 50 MG Cap Take 1 capsule (50 mg total) by mouth daily as needed (anxiety). 30 capsule 0    meloxicam (MOBIC) 15 MG tablet Take 1 tablet (15 mg total) by mouth once daily. 14 tablet 0    QUEtiapine (SEROQUEL) 50 MG tablet Take 1 tablet (50 mg total) by mouth every evening. 30 tablet 11       Past History   As per HPI and below:  Past Medical History:   Diagnosis Date    Anxiety disorder, unspecified     Hypertension     Paget disease of bone      No past surgical history on file.    Social History     Socioeconomic History    Marital status:    Tobacco Use    Smoking status: Every Day     Current packs/day: 1.00     Types: Cigarettes    Smokeless tobacco: Never    Tobacco comments:     Pt started smoking daily again this week after quitting for 6 " years   Substance and Sexual Activity    Alcohol use: Yes     Comment: daily past week    Drug use: No    Sexual activity: Yes   Social History Narrative    - exercise infrequently       Family History   Problem Relation Age of Onset    Diabetes Mother     Diabetes Father     Hypertension Sister     Hypertension Brother     Hypertension Sister        Physical Exam     Vitals:    09/25/23 1105 09/25/23 1156 09/25/23 1205 09/25/23 1344   BP: (!) 179/102  (!) 159/101 (!) 161/95   Patient Position:    Sitting   Pulse: (!) 119  (!) 112 102   Resp: 18  18 14   Temp: 98.3 °F (36.8 °C)   98.6 °F (37 °C)   TempSrc: Oral      SpO2: 99%  99% 99%   Weight:  87.1 kg (192 lb)       Appearance: No acute distress.  Skin: No rashes seen.  Good turgor.  No abrasions.  No ecchymoses.  Eyes: No conjunctival injection.  ENT: Oropharynx clear.    Chest: Clear to auscultation bilaterally.  Good air movement.  No wheezes.  No rhonchi.  Cardiovascular: Regular rate and rhythm.  No murmurs. No gallops. No rubs.  Abdomen: Soft.  Not distended.  Nontender.  No guarding.  No rebound.  Musculoskeletal: Good range of motion all joints.  No deformities.  Neck supple.  No meningismus.  Neurologic: Motor intact.  Sensation intact.  Cranial nerves intact.  Mental Status:  Tangential, delusional, paranoid.    Initial MDM   Patient with paranoid delusions that people are out to get him, states that he is calling 911 to multiple cities and nobody is helping him.  Claims he was at [facility] this morning, but review of our medical records indicate that is not the case.  Last ED visit in the [facility] system was 9 days ago and the patient was sent to a psychiatric facility.  I think the patient is gravely disabled today and needs to go back to a psychiatric facility.  I will do a standard a psych clearance workup.    External Records Reviewed: Psych admit noted above    Medications Given     Medications   midazolam (VERSED) 1 mg/mL injection 5 mg (0 mg  Intramuscular Hold 9/25/23 1130)       Results and Course     Labs Reviewed   CBC W/ AUTO DIFFERENTIAL - Abnormal; Notable for the following components:       Result Value    MCHC 31.5 (*)     RDW 15.8 (*)     All other components within normal limits   COMPREHENSIVE METABOLIC PANEL - Abnormal; Notable for the following components:    CO2 18 (*)     Glucose 111 (*)     All other components within normal limits   URINALYSIS, REFLEX TO URINE CULTURE - Abnormal; Notable for the following components:    Specific Gravity, UA >=1.030 (*)     Protein, UA 1+ (*)     Ketones, UA Trace (*)     Bilirubin (UA) 1+ (*)     All other components within normal limits    Narrative:     Specimen Source->Urine   DRUG SCREEN PANEL, URINE EMERGENCY - Abnormal; Notable for the following components:    Creatinine, Urine 426.0 (*)     All other components within normal limits    Narrative:     Specimen Source->Urine   ACETAMINOPHEN LEVEL - Abnormal; Notable for the following components:    Acetaminophen (Tylenol), Serum <3.0 (*)     All other components within normal limits   URINALYSIS MICROSCOPIC - Abnormal; Notable for the following components:    RBC, UA 7 (*)     All other components within normal limits    Narrative:     Specimen Source->Urine   TSH   ALCOHOL,MEDICAL (ETHANOL)       Imaging Results    None         ED Course as of 09/25/23 1359   Mon Sep 25, 2023   1303 TSH: 0.956 [DC]   1303 Alcohol, Serum: <10 [DC]   1303 Acetaminophen (Tylenol), Serum(!): <3.0 [DC]   1303 Creatinine: 1.0 [DC]   1303 Platelets: 224 [DC]   1303 Hemoglobin: 14.4 [DC]   1303 WBC: 5.97 [DC]   1359 Pulse: 102 [DC]      ED Course User Index  [DC] José Delgado MD           MDM, Impression and Plan   63 y.o. male with paranoid delusions and bizarre behavior.  He has not been violent here, but the police were concerned about him.  Denies any kong homicidal or suicidal ideation, but he is obviously gravely disabled.  Pec has been placed.  Will transfer  for inpatient psychiatric care.  Medical workup benign.    Medically cleared for psychiatry placement: 9/25/2023  1:58 PM    Final diagnoses:  [F23] Acute psychosis (Primary)        ED Disposition Condition    Transfer to Psych Facility Stable          ED Prescriptions    None       Follow-up Information    None            José Delgado MD  09/25/23 4425

## 2023-09-25 NOTE — ED NOTES
Pt presented to the ED by RUBINA, he has rambling speech, thought process is grandiose and delusional. Pt changed into paper scrubs, all belongings secured outside of the room. Pt initially refusing lab draw. Pt informed of his PEC status and that he will be giving blood and urine samples. Pt continues to refuse MD notified, medication ordered. Pt agreed to lab draw when informed that he will be medicated if he continues to refuse. Pt has somatic complaints of a cough but has not had a cough while here in the ED. EDT outside of the room DVC maintained.

## 2023-09-26 PROBLEM — Z13.9 ENCOUNTER FOR MEDICAL SCREENING EXAMINATION: Status: ACTIVE | Noted: 2023-09-26

## 2023-09-26 NOTE — ED NOTES
Notified sister, Evangelina Moss @ [phone] of patient's transfer, name & address of the facility.

## 2023-09-28 PROBLEM — R52 GENERALIZED PAIN: Status: ACTIVE | Noted: 2023-09-28

## 2023-10-04 ENCOUNTER — HOSPITAL ENCOUNTER (EMERGENCY)
Facility: HOSPITAL | Age: 63
Discharge: HOME | End: 2023-10-04
Attending: STUDENT IN AN ORGANIZED HEALTH CARE EDUCATION/TRAINING PROGRAM
Payer: MEDICAID

## 2023-10-04 VITALS
DIASTOLIC BLOOD PRESSURE: 98 MMHG | OXYGEN SATURATION: 100 % | BODY MASS INDEX: 28.7 KG/M2 | RESPIRATION RATE: 18 BRPM | HEART RATE: 102 BPM | WEIGHT: 200 LBS | SYSTOLIC BLOOD PRESSURE: 164 MMHG | TEMPERATURE: 99 F

## 2023-10-04 DIAGNOSIS — F99 PSYCHIATRIC DISORDER: ICD-10-CM

## 2023-10-04 DIAGNOSIS — Z91.199 MEDICALLY NONCOMPLIANT: Primary | ICD-10-CM

## 2023-10-04 PROCEDURE — 99281 EMR DPT VST MAYX REQ PHY/QHP: CPT

## 2023-10-04 NOTE — DISCHARGE INSTRUCTIONS
It was a pleasure taking care of you today!     Home Care:   - Please try to take medications as prescribed    Follow-Up Plan:  - Follow-up with primary care doctor within 3 - 5 days to discuss your recent ER visit and any additional concerns that you may have.  - Additional testing and/or evaluation as directed by your primary doctor    Return to the Emergency Department for symptoms including but not limited to: persistence or worsening of symptoms, shortness of breath or chest pain, inability to drink without vomiting, passing out/fainting/ loss of consciousness, or if you have other concerns.

## 2023-10-04 NOTE — ED TRIAGE NOTES
Pt arrives to ED stating he does not want to be here. Pt states he was newly prescribed lithium and does not want to take it because he is afraid of the side effects. Pt denies SI/HI. Pt denies hallucinations.

## 2023-10-04 NOTE — ED PROVIDER NOTES
"Encounter Date: 10/4/2023       History     Chief Complaint   Patient presents with    Psychiatric Evaluation     Pt brought to ER by son. States he "will not take 300 mgs of lithium" because it states it has the risk of dying. Denies SI/HI, hearing/seeing things that are not there.      63-year-old male with PMH of psychiatric disorder presents with refusal to take medications.  Patient reports that he was discharged from a psychiatric hospital earlier today and he was prescribed lithium at the time of discharge.  He reports that he refused to take the lithium throughout his admission and that he still does not want to take it.  He is worried about a side effect causing him to die.  He reports that in general he does not like to take any medications.  He denies SI, HI, and AVH.  He denies any physical complaints at this time.    Per patient's sister, who is bedside and who the patient lives with, she brought him to the ER because she is still concerned about his psychiatric status.  She reports that before he was sent to the psychiatric facility he was always walking around at night and not sleeping and was impulsive with purchasing things.  She reports he is never been violent towards himself or others.  She reports that he has a follow-up appointment psychiatry in 2 days.    The history is provided by the patient and a relative.     Review of patient's allergies indicates:   Allergen Reactions    Doxycycline Other (See Comments)     Past Medical History:   Diagnosis Date    Anxiety disorder, unspecified     Hypertension     Paget disease of bone      No past surgical history on file.  Family History   Problem Relation Age of Onset    Diabetes Mother     Diabetes Father     Hypertension Sister     Hypertension Brother     Hypertension Sister      Social History     Tobacco Use    Smoking status: Every Day     Current packs/day: 1.00     Types: Cigarettes    Smokeless tobacco: Never    Tobacco comments:     Pt " started smoking daily again this week after quitting for 6 years   Substance Use Topics    Alcohol use: Yes     Comment: daily past week    Drug use: No     Review of Systems    Physical Exam     Initial Vitals   BP Pulse Resp Temp SpO2   10/04/23 1710 10/04/23 1710 10/04/23 1710 10/04/23 1711 10/04/23 1710   (!) 164/98 102 18 98.7 °F (37.1 °C) 100 %      MAP       --                Physical Exam    Nursing note and vitals reviewed.  Constitutional: He appears well-developed and well-nourished. He is not diaphoretic. No distress.   HENT:   Head: Normocephalic and atraumatic.   Eyes: Conjunctivae and EOM are normal. Pupils are equal, round, and reactive to light.   Neck: Neck supple.   Normal range of motion.  Cardiovascular:  Normal rate, regular rhythm, normal heart sounds and intact distal pulses.           No murmur heard.  Pulmonary/Chest: Breath sounds normal. No respiratory distress. He has no wheezes. He has no rhonchi. He has no rales.   Abdominal: Abdomen is soft. He exhibits no distension. There is no abdominal tenderness. There is no rebound and no guarding.   Musculoskeletal:         General: No tenderness or edema.      Cervical back: Normal range of motion and neck supple.     Neurological: He is alert and oriented to person, place, and time.   Skin: Skin is warm and dry. Capillary refill takes less than 2 seconds.   Psychiatric:   Mildly blunted affect.  Normal speech.  He is not agitated, aggressive, or hyperactive.  No SI, HI.  Does not seem to be responding to internal stimuli.         ED Course   Procedures  Labs Reviewed - No data to display         Imaging Results    None          Medications - No data to display  Medical Decision Making  Hemodynamically stable 63-year-old male presents with his sister for refusing to take his lithium    Differential:  Medication noncompliance, bipolar disorder, acute psychosis     Patient is not showing any evidence of acute psychosis or acute aracelis.  He is  calm and cooperative.  I had an extensive discussion with the patient sister and reported that the patient currently has capacity to refuse taking his lithium but that he also has the Haldol in his system that should last 30 days and provide some benefit.  Since he is not suicidal, homicidal, or gravely disabled, have no indication to pec this patient.  His sister reports understanding.  Patient discharged with instructions to follow-up in 2 days.  Patient and sister agree with the plan.    Amount and/or Complexity of Data Reviewed  External Data Reviewed: notes.     Details: [facility] patient was admitted for grave disability under a pec.  Patient was discharged with psychiatry follow-up in a few days.  They gave the patient IM Haldol.    Risk  Prescription drug management.                               Clinical Impression:   Final diagnoses:  [Z91.199] Medically noncompliant (Primary)  [F99] Psychiatric disorder        ED Disposition Condition    Discharge Stable          ED Prescriptions    None       Follow-up Information       Follow up With Specialties Details Why Contact Info    Primary Care Physician  Schedule an appointment as soon as possible for a visit  As needed and to discuss recent ER visit     [facility] - Emergency Dept Emergency Medicine Go to  As needed, If symptoms worsen [address]  [facility] [id number] [phone]             Nelson Lainez MD  10/04/23 2850

## 2023-10-04 NOTE — ED NOTES
Pt refusing to be connected to telemonitoring, bp cuff and pulse ox. MD Lainez notified and at bedside.

## 2023-10-06 ENCOUNTER — HOSPITAL ENCOUNTER (EMERGENCY)
Facility: HOSPITAL | Age: 63
Discharge: HOME | End: 2023-10-06
Attending: STUDENT IN AN ORGANIZED HEALTH CARE EDUCATION/TRAINING PROGRAM
Payer: MEDICAID

## 2023-10-06 VITALS
TEMPERATURE: 98 F | DIASTOLIC BLOOD PRESSURE: 88 MMHG | SYSTOLIC BLOOD PRESSURE: 146 MMHG | OXYGEN SATURATION: 100 % | RESPIRATION RATE: 16 BRPM | HEART RATE: 95 BPM

## 2023-10-06 DIAGNOSIS — R41.82 AMS (ALTERED MENTAL STATUS): ICD-10-CM

## 2023-10-06 LAB
ALBUMIN SERPL BCP-MCNC: 3.8 G/DL (ref 3.5–5.2)
ALP SERPL-CCNC: 120 U/L (ref 55–135)
ALT SERPL W/O P-5'-P-CCNC: 21 U/L (ref 10–44)
AMPHET+METHAMPHET UR QL: NEGATIVE
ANION GAP SERPL CALC-SCNC: 7 MMOL/L (ref 8–16)
APAP SERPL-MCNC: <3 UG/ML (ref 10–20)
AST SERPL-CCNC: 26 U/L (ref 10–40)
BARBITURATES UR QL SCN>200 NG/ML: NEGATIVE
BASOPHILS # BLD AUTO: 0.04 K/UL (ref 0–0.2)
BASOPHILS NFR BLD: 0.6 % (ref 0–1.9)
BENZODIAZ UR QL SCN>200 NG/ML: NEGATIVE
BILIRUB SERPL-MCNC: 0.3 MG/DL (ref 0.1–1)
BILIRUB UR QL STRIP: NEGATIVE
BUN SERPL-MCNC: 13 MG/DL (ref 8–23)
BZE UR QL SCN: NEGATIVE
CALCIUM SERPL-MCNC: 9.6 MG/DL (ref 8.7–10.5)
CANNABINOIDS UR QL SCN: NEGATIVE
CHLORIDE SERPL-SCNC: 104 MMOL/L (ref 95–110)
CLARITY UR REFRACT.AUTO: CLEAR
CO2 SERPL-SCNC: 26 MMOL/L (ref 23–29)
COLOR UR AUTO: YELLOW
CREAT SERPL-MCNC: 1 MG/DL (ref 0.5–1.4)
CREAT UR-MCNC: 99 MG/DL (ref 23–375)
DIFFERENTIAL METHOD: ABNORMAL
EOSINOPHIL # BLD AUTO: 0.1 K/UL (ref 0–0.5)
EOSINOPHIL NFR BLD: 1.5 % (ref 0–8)
ERYTHROCYTE [DISTWIDTH] IN BLOOD BY AUTOMATED COUNT: 15.9 % (ref 11.5–14.5)
EST. GFR  (NO RACE VARIABLE): >60 ML/MIN/1.73 M^2
ETHANOL SERPL-MCNC: <10 MG/DL
GLUCOSE SERPL-MCNC: 125 MG/DL (ref 70–110)
GLUCOSE UR QL STRIP: NEGATIVE
HCT VFR BLD AUTO: 43.3 % (ref 40–54)
HCV AB SERPL QL IA: NORMAL
HGB BLD-MCNC: 14.3 G/DL (ref 14–18)
HGB UR QL STRIP: ABNORMAL
HIV 1+2 AB+HIV1 P24 AG SERPL QL IA: NORMAL
IMM GRANULOCYTES # BLD AUTO: 0.01 K/UL (ref 0–0.04)
IMM GRANULOCYTES NFR BLD AUTO: 0.2 % (ref 0–0.5)
KETONES UR QL STRIP: NEGATIVE
LEUKOCYTE ESTERASE UR QL STRIP: NEGATIVE
LITHIUM SERPL-SCNC: 0.1 MMOL/L (ref 0.6–1.2)
LYMPHOCYTES # BLD AUTO: 2.9 K/UL (ref 1–4.8)
LYMPHOCYTES NFR BLD: 46.3 % (ref 18–48)
MCH RBC QN AUTO: 27.1 PG (ref 27–31)
MCHC RBC AUTO-ENTMCNC: 33 G/DL (ref 32–36)
MCV RBC AUTO: 82 FL (ref 82–98)
METHADONE UR QL SCN>300 NG/ML: NEGATIVE
MICROSCOPIC COMMENT: NORMAL
MONOCYTES # BLD AUTO: 0.4 K/UL (ref 0.3–1)
MONOCYTES NFR BLD: 6.3 % (ref 4–15)
NEUTROPHILS # BLD AUTO: 2.8 K/UL (ref 1.8–7.7)
NEUTROPHILS NFR BLD: 45.1 % (ref 38–73)
NITRITE UR QL STRIP: NEGATIVE
NRBC BLD-RTO: 0 /100 WBC
OPIATES UR QL SCN: NEGATIVE
PCP UR QL SCN>25 NG/ML: NEGATIVE
PH UR STRIP: 6 [PH] (ref 5–8)
PLATELET # BLD AUTO: 291 K/UL (ref 150–450)
PMV BLD AUTO: 9.6 FL (ref 9.2–12.9)
POTASSIUM SERPL-SCNC: 3.8 MMOL/L (ref 3.5–5.1)
PROT SERPL-MCNC: 7.7 G/DL (ref 6–8.4)
PROT UR QL STRIP: NEGATIVE
RBC # BLD AUTO: 5.28 M/UL (ref 4.6–6.2)
RBC #/AREA URNS AUTO: 3 /HPF (ref 0–4)
SODIUM SERPL-SCNC: 137 MMOL/L (ref 136–145)
SP GR UR STRIP: 1.01 (ref 1–1.03)
TOXICOLOGY INFORMATION: NORMAL
TSH SERPL DL<=0.005 MIU/L-ACNC: 2.21 UIU/ML (ref 0.4–4)
URN SPEC COLLECT METH UR: ABNORMAL
WBC # BLD AUTO: 6.18 K/UL (ref 3.9–12.7)
WBC #/AREA URNS AUTO: 0 /HPF (ref 0–5)

## 2023-10-06 PROCEDURE — 93010 EKG 12-LEAD: ICD-10-PCS | Mod: ,,, | Performed by: INTERNAL MEDICINE

## 2023-10-06 PROCEDURE — 80143 DRUG ASSAY ACETAMINOPHEN: CPT | Performed by: STUDENT IN AN ORGANIZED HEALTH CARE EDUCATION/TRAINING PROGRAM

## 2023-10-06 PROCEDURE — 82077 ASSAY SPEC XCP UR&BREATH IA: CPT | Performed by: STUDENT IN AN ORGANIZED HEALTH CARE EDUCATION/TRAINING PROGRAM

## 2023-10-06 PROCEDURE — 87389 HIV-1 AG W/HIV-1&-2 AB AG IA: CPT | Performed by: PHYSICIAN ASSISTANT

## 2023-10-06 PROCEDURE — 84443 ASSAY THYROID STIM HORMONE: CPT | Performed by: STUDENT IN AN ORGANIZED HEALTH CARE EDUCATION/TRAINING PROGRAM

## 2023-10-06 PROCEDURE — 80307 DRUG TEST PRSMV CHEM ANLYZR: CPT | Performed by: STUDENT IN AN ORGANIZED HEALTH CARE EDUCATION/TRAINING PROGRAM

## 2023-10-06 PROCEDURE — 80178 ASSAY OF LITHIUM: CPT | Performed by: STUDENT IN AN ORGANIZED HEALTH CARE EDUCATION/TRAINING PROGRAM

## 2023-10-06 PROCEDURE — 93010 ELECTROCARDIOGRAM REPORT: CPT | Mod: ,,, | Performed by: INTERNAL MEDICINE

## 2023-10-06 PROCEDURE — 81001 URINALYSIS AUTO W/SCOPE: CPT | Mod: 59 | Performed by: STUDENT IN AN ORGANIZED HEALTH CARE EDUCATION/TRAINING PROGRAM

## 2023-10-06 PROCEDURE — 86803 HEPATITIS C AB TEST: CPT | Performed by: PHYSICIAN ASSISTANT

## 2023-10-06 PROCEDURE — 85025 COMPLETE CBC W/AUTO DIFF WBC: CPT | Performed by: STUDENT IN AN ORGANIZED HEALTH CARE EDUCATION/TRAINING PROGRAM

## 2023-10-06 PROCEDURE — 93005 ELECTROCARDIOGRAM TRACING: CPT

## 2023-10-06 PROCEDURE — 80053 COMPREHEN METABOLIC PANEL: CPT | Performed by: STUDENT IN AN ORGANIZED HEALTH CARE EDUCATION/TRAINING PROGRAM

## 2023-10-06 PROCEDURE — 99284 EMERGENCY DEPT VISIT MOD MDM: CPT

## 2023-10-07 NOTE — DISCHARGE INSTRUCTIONS
Follow-up with psychiatry and previously scheduled appointment.  Take meds as previously prescribed.  Return to emergency department if there is any concerns for delusions, paranoia, suicidal ideation or homicidal ideation.

## 2023-10-07 NOTE — ED NOTES
"Thaddeus Moss, a 63 y.o. male presents to the ED w/ complaint of Deluisional    "Someone put something in my food and I only ate from my sister". Family member and patient states he stopped taking his psych medications because he did not like the side effects that he seen on the internet    Triage note:  Chief Complaint   Patient presents with    Delusional     Thinks someone is poisoning his food or water at home. Thinks his sister disappeared yesterday. "There's something wrong with a lot of people." Rx haldol and lithium but is not taking at home     Review of patient's allergies indicates:   Allergen Reactions    Doxycycline Other (See Comments)     Past Medical History:   Diagnosis Date    Anxiety disorder, unspecified     Hypertension     Paget disease of bone       "
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
## 2023-10-07 NOTE — ED PROVIDER NOTES
"Encounter Date: 10/6/2023       History     Chief Complaint   Patient presents with    Delusional     Thinks someone is poisoning his food or water at home. Thinks his sister disappeared yesterday. "There's something wrong with a lot of people." Rx haldol and lithium but is not taking at home     63-year-old male with PMH of psychiatric disorder presenting to the emergency department due to sister's concern about patient's behavior.  She reports that she came here 2 days ago for evaluation as patient was not taking his lithium and was just discharged from the hospital.  Patient was discharged as he was not gravely disabled with capacity.  Today sister brings him in due to patient complaining of feeling "different".  She reports that patient accused her of putting something in his food and water to make him sleepy.  Patient states that did not say that but he also said "there was no one else was around".  Patient's sister at bedside states that patient has never been violent or threatened her but she states that him increasing her made her fearful.  She lives alone with him and states that she does not feel safe.  Patient has never had any acts of aggression or known violence.  Patient denies any SI, HI.  Sister reports that patient has previously had hallucinations but she has not noticed any auditory or visual hallucinations at this time.     The history is provided by the patient and a relative.     Review of patient's allergies indicates:   Allergen Reactions    Doxycycline Other (See Comments)     Past Medical History:   Diagnosis Date    Anxiety disorder, unspecified     Hypertension     Paget disease of bone      No past surgical history on file.  Family History   Problem Relation Age of Onset    Diabetes Mother     Diabetes Father     Hypertension Sister     Hypertension Brother     Hypertension Sister      Social History     Tobacco Use    Smoking status: Every Day     Current packs/day: 1.00     Types: " Cigarettes    Smokeless tobacco: Never    Tobacco comments:     Pt started smoking daily again this week after quitting for 6 years   Substance Use Topics    Alcohol use: Yes     Comment: daily past week    Drug use: No         Physical Exam     Initial Vitals [10/06/23 1906]   BP Pulse Resp Temp SpO2   (!) 154/92 102 18 98.9 °F (37.2 °C) 99 %      MAP       --         Physical Exam    Nursing note and vitals reviewed.  Constitutional: He appears well-developed and well-nourished.   HENT:   Head: Normocephalic and atraumatic.   Eyes: EOM are normal. Pupils are equal, round, and reactive to light.   Neck:   Normal range of motion.  Musculoskeletal:         General: Normal range of motion.      Cervical back: Normal range of motion.     Neurological: He is alert and oriented to person, place, and time.   Skin: Skin is warm and dry.   Psychiatric: His speech is normal. His affect is blunt. Thought content is delusional. He does not express impulsivity. He expresses no homicidal and no suicidal ideation. He expresses no suicidal plans and no homicidal plans.         ED Course   Procedures  Labs Reviewed   LITHIUM LEVEL - Abnormal; Notable for the following components:       Result Value    Lithium Level 0.1 (*)     All other components within normal limits   CBC W/ AUTO DIFFERENTIAL - Abnormal; Notable for the following components:    RDW 15.9 (*)     All other components within normal limits   COMPREHENSIVE METABOLIC PANEL - Abnormal; Notable for the following components:    Glucose 125 (*)     Anion Gap 7 (*)     All other components within normal limits   URINALYSIS, REFLEX TO URINE CULTURE - Abnormal; Notable for the following components:    Occult Blood UA 1+ (*)     All other components within normal limits    Narrative:     Specimen Source->Urine   ACETAMINOPHEN LEVEL - Abnormal; Notable for the following components:    Acetaminophen (Tylenol), Serum <3.0 (*)     All other components within normal limits   HIV 1  / 2 ANTIBODY    Narrative:     Release to patient->Immediate   HEPATITIS C ANTIBODY    Narrative:     Release to patient->Immediate   TSH   DRUG SCREEN PANEL, URINE EMERGENCY    Narrative:     Specimen Source->Urine   ALCOHOL,MEDICAL (ETHANOL)   URINALYSIS MICROSCOPIC    Narrative:     Specimen Source->Urine          Imaging Results    None          Medications - No data to display  Medical Decision Making  63-year-old male who presented to the emergency department for evaluation due to sister's concern and feeling unsafe at home.  Upon assessment patient does not show clear signs of grave disability is speaking in the linear fashion with a single or delusion.  He is not made any threats or been aggressive to the patient.  Given complex history consulted psych however medical clearance workup initiated due to previous history of requirement of inpatient psych facility.  Patient placed under initial pec with plan to hold or rescind once psych evaluate patient.  Labs with no gross derangements psych evaluated patient and after discussion with sister she stated that she does no longer feel unsafe with him at home and psych reports that patient has good follow-up and felt that patient was safe to be discharged home.  At this time pec rescinded and patient discharged with return precautions discussed and understood.    Amount and/or Complexity of Data Reviewed  Labs: ordered.                               Clinical Impression:   Final diagnoses:  [R41.82] AMS (altered mental status)               Karlee Brennan MD  Resident  10/06/23 [id number]

## 2023-10-16 ENCOUNTER — HOSPITAL ENCOUNTER (EMERGENCY)
Facility: HOSPITAL | Age: 63
Discharge: HOME | End: 2023-10-16
Attending: EMERGENCY MEDICINE
Payer: MEDICAID

## 2023-10-16 VITALS
HEART RATE: 92 BPM | WEIGHT: 185 LBS | HEIGHT: 71 IN | RESPIRATION RATE: 16 BRPM | TEMPERATURE: 99 F | SYSTOLIC BLOOD PRESSURE: 112 MMHG | DIASTOLIC BLOOD PRESSURE: 79 MMHG | OXYGEN SATURATION: 100 % | BODY MASS INDEX: 25.9 KG/M2

## 2023-10-16 DIAGNOSIS — R53.83 FATIGUE, UNSPECIFIED TYPE: ICD-10-CM

## 2023-10-16 DIAGNOSIS — R10.84 GENERALIZED ABDOMINAL PAIN: ICD-10-CM

## 2023-10-16 DIAGNOSIS — M54.89 MIDLINE BACK PAIN, UNSPECIFIED BACK LOCATION, UNSPECIFIED CHRONICITY: Primary | ICD-10-CM

## 2023-10-16 LAB
ALBUMIN SERPL-MCNC: 3.5 G/DL (ref 3.3–5.5)
ALP SERPL-CCNC: 98 U/L (ref 42–141)
AMPHET+METHAMPHET UR QL: NEGATIVE
APAP SERPL-MCNC: 3 UG/ML (ref 10–20)
BARBITURATES UR QL SCN>200 NG/ML: NEGATIVE
BENZODIAZ UR QL SCN>200 NG/ML: NEGATIVE
BILIRUB SERPL-MCNC: 0.5 MG/DL (ref 0.2–1.6)
BILIRUBIN, POC UA: NEGATIVE
BLOOD, POC UA: ABNORMAL
BUN SERPL-MCNC: 13 MG/DL (ref 7–22)
BZE UR QL SCN: NEGATIVE
CALCIUM SERPL-MCNC: 9.3 MG/DL (ref 8–10.3)
CANNABINOIDS UR QL SCN: NEGATIVE
CHLORIDE SERPL-SCNC: 109 MMOL/L (ref 98–108)
CLARITY, POC UA: CLEAR
COLOR, POC UA: YELLOW
CREAT SERPL-MCNC: 0.9 MG/DL (ref 0.6–1.2)
CREAT UR-MCNC: 138.7 MG/DL (ref 23–375)
ETHANOL SERPL-MCNC: <10 MG/DL
GLUCOSE SERPL-MCNC: 110 MG/DL (ref 73–118)
GLUCOSE, POC UA: NEGATIVE
KETONES, POC UA: NEGATIVE
LEUKOCYTE EST, POC UA: NEGATIVE
LITHIUM SERPL-SCNC: <0.1 MMOL/L (ref 0.6–1.2)
MAGNESIUM SERPL-MCNC: 2.1 MG/DL (ref 1.6–2.6)
METHADONE UR QL SCN>300 NG/ML: NEGATIVE
NITRITE, POC UA: NEGATIVE
OPIATES UR QL SCN: NEGATIVE
PCP UR QL SCN>25 NG/ML: NEGATIVE
PH UR STRIP: 7 [PH]
POC ALT (SGPT): 40 U/L (ref 10–47)
POC AST (SGOT): 36 U/L (ref 11–38)
POC TCO2: 28 MMOL/L (ref 18–33)
POTASSIUM BLD-SCNC: 3.7 MMOL/L (ref 3.6–5.1)
PROTEIN, POC UA: ABNORMAL
PROTEIN, POC: 7.1 G/DL (ref 6.4–8.1)
SALICYLATES SERPL-MCNC: <5 MG/DL (ref 15–30)
SODIUM BLD-SCNC: 137 MMOL/L (ref 128–145)
SPECIFIC GRAVITY, POC UA: 1.02
TOXICOLOGY INFORMATION: NORMAL
TSH SERPL DL<=0.005 MIU/L-ACNC: 0.86 UIU/ML (ref 0.4–4)
UROBILINOGEN, POC UA: 1 E.U./DL

## 2023-10-16 PROCEDURE — 83735 ASSAY OF MAGNESIUM: CPT | Performed by: EMERGENCY MEDICINE

## 2023-10-16 PROCEDURE — 80178 ASSAY OF LITHIUM: CPT | Performed by: EMERGENCY MEDICINE

## 2023-10-16 PROCEDURE — 80143 DRUG ASSAY ACETAMINOPHEN: CPT | Performed by: EMERGENCY MEDICINE

## 2023-10-16 PROCEDURE — 80053 COMPREHEN METABOLIC PANEL: CPT | Mod: ER

## 2023-10-16 PROCEDURE — 99284 EMERGENCY DEPT VISIT MOD MDM: CPT | Mod: 25,ER

## 2023-10-16 PROCEDURE — 80179 DRUG ASSAY SALICYLATE: CPT | Performed by: EMERGENCY MEDICINE

## 2023-10-16 PROCEDURE — 84443 ASSAY THYROID STIM HORMONE: CPT | Performed by: EMERGENCY MEDICINE

## 2023-10-16 PROCEDURE — 82077 ASSAY SPEC XCP UR&BREATH IA: CPT | Performed by: EMERGENCY MEDICINE

## 2023-10-16 PROCEDURE — 80307 DRUG TEST PRSMV CHEM ANLYZR: CPT | Performed by: EMERGENCY MEDICINE

## 2023-10-16 NOTE — DISCHARGE INSTRUCTIONS
As we discussed, it is important that you return to the ER for any new concerns or symptoms, worsening of your existing symptoms, if you do not completely improve, or if you are unable to be seen by your primary care provider.  An ER visit cannot replace the evaluation by your primary care provider!!    In the emergency department our goal is to rule-out life threatening conditions and make sure that you are safe to be discharged home. Many medical conditions are difficult to diagnose and may be impossible to be diagnosed during a single ER visit. These conditions often start with non-specific symptoms and can only be diagnosed on follow up visits with your primary care physician or specialist when the symptoms continue or change. Please remember that all medical conditions can change, and we cannot predict how you will be feeling tomorrow or the next day, so if you have any worsening or new symptoms, you should not hesitate to return to the emergency department for re-evaluation.        Be sure to follow up with your primary care doctor for a recheck and to review any labs/imaging that were performed today.  It is very common for us to identify non-emergent incidental findings on labs and imaging which must be followed up with your primary care physician. If you do not have a primary care doctor, you may contact the one listed on your discharge paperwork or you may also call the [facility] Appointment Desk at [phone] to schedule an appointment with one.       All medications have side effects.  We have done our best to select a medication for you that will treat your health problem and have the least amount of side effects.  If at any time while or after you take this medication you develops new symptoms or have any concerns, it is important you stop taking the medication and call your primary care provider or return to the emergency department for evaluation.

## 2023-10-16 NOTE — ED PROVIDER NOTES
"SCRIBE #1 NOTE: I, Susana Vernon, am scribing for, and in the presence of,  Narinder Orellana MD. I have scribed the following portions of the note - Other sections scribed: HPI, KRYSTEN PE.           EM PHYSICIAN NOTE       This patient presents with a complaint of   Chief Complaint   Patient presents with    General Illness     Pt presents to the ED with multiple complaints, stating "I need to be able to walk again", pt ambulatory to triage. Pt presents with a list of problems including weakness and low energy levels.       HPI: Thaddeus Moss is a 63 y.o. male, with a PMHx of Paget's disease and psychiatric disorder, who presents to the ED with BLE pain and weakness x3 weeks, left leg greater than right. Patient reports he has been to [facility] 10,000 times" and was sent to behavioral health center in [place] where he received a long-acting shot. His pain began after the shot. Denies fall, injury, or trauma. Patient states his sister directed him to ED because she wants him to walk normally again. Patient additionally reports photophobia, voice change, and a rash to his bilateral hips with associated itching x1 week. Patient states he gets out of breath easily, and he is usually high-energy. Reports COVID dx 1.5 mo/ago, and that he has lost 16 lbs since. Also reports pre-existing urinary frequency. Patient has not seen PCP. Patient wants MRI. No other exacerbating or alleviating factors. Denies fever, chills, nausea, vomiting or other associated symptoms.         Review of patient's allergies indicates:   Allergen Reactions    Doxycycline Other (See Comments)       Preferred pharmacy: [facility] [address] [place] [id number]        Pertinent REVIEW of SYSTEMS  Source: patient   The nurse's notes and triage vital signs were reviewed.  GENERAL/CONSTITUTIONAL: There is not a report of fever   CARDIOVASCULAR: There is not a report of chest pain   RESPIRATORY: see HPI   GASTROINTESTINAL: There is not a report of  " "vomiting, diarrhea  HEMATOLOGIC/LYMPHATIC: There is not a report of anticoagulant/antithrombotic use.         PHYSICAL EXAMINATION    ED Triage Vitals [10/16/23 0931]   Enc Vitals Group      /79      Pulse 89      Resp 18      Temp 98.7 °F (37.1 °C)      Temp Source Oral      SpO2 99 %      Weight 206 lb      Height 5' 11"      Head Circumference       Peak Flow       Pain Score       Pain Loc       Pain Edu?       Excl. in GC?      Vital signs and Pulse Ox reviewed in clinical context. Abnormalities noted:  Normal  Body mass index is 25.8 kg/m².  Pt's level of consciousness is Awake and Alert, and the patient is in mild distress. Flat affect.   Skin: warm, pink and dry.  Capillary refill is less than 2 seconds.  Mucosa: deferred   Head and Neck: no JVD, neck supple  Cardiac exam: RRR, radial pulse strong  Pulmonary exam: unlabored and clear  Abd Exam:  Mild tenderness to patient, bilateral CVA tenderness  Musculoskeletal: no deformity or swelling   Neurologic: GCS 15; moving all extremities equally, no facial droop; no pronator drift, no dysmetria, no nystagmus.  No visual field deficits.  Patient has a shuffling gait.  Patient has normal flexion and extension strength of the toes, ankle, knees and hips bilaterally  Psych:  Flat affect, occasionally inappropriate comments/laughing.  No SI/HI.  Brought in by family member.      Medical decision making:   Nurses notes and Vital Signs reviewed.     Problems: Today's visit reveals back pain, weakness, leg pain which is a/an Acute problem that is concerning for deterioration due to a differential diagnosis that includes electrolyte abnormality, intracranial bleed, compression fractures.          MDM Components integrated into this visit: Decision-making pertaining to admit/discharge, imaging or labs:  See ED course, patient did not wish to stay for final reading of the CT.  She demonstrates capacity to understand the risks versus benefits of this and plans to " follow-up with his primary care physician.    See ER course below for lab test ordered, results reviewed, independent interpretation of images or EKG, discussion with consultants, data obtained from sources other than patient:  ED Course as of 10/16/23 1845   Mon Oct 16, 2023   1020 CT of the head on my preliminary/independent read, no acute bleed []   1048 CMP reveals a mild elevation in chloride otherwise normal.  Calcium is normal. []   1101 UA is normal []   1102 Patient is requesting to be discharged.  He understands that we do not have the final reading of the CTs.  He will follow-up with his primary care physician for the results.  Now ambulating with a steady gait.  The previously documented shuffling gait has resolved. []      ED Course User Index  [] Narinder Orellana MD         Orders Placed This Encounter   Procedures    CT Head Without Contrast    CT Abdomen Pelvis  Without Contrast    Lithium level    Ethanol    Drug screen panel, in-house    Salicylate Level    Acetaminophen Level    TSH    Magnesium    POCT CBC    POCT URINALYSIS W/O SCOPE    POCT URINALYSIS W/O SCOPE         Diagnoses that have been ruled out:   None   Diagnoses that are still under consideration:   None   Final diagnoses:   Midline back pain, unspecified back location, unspecified chronicity   Fatigue, unspecified type   Generalized abdominal pain               Referral for follow-up  Follow-up Information       Follow up With Specialties Details Why Contact Info    [facility] - [address], [place] [address] [facility] [place] [phone] [facility] [address]  [address] [place] [id number] [phone]              Prescription management:  ED Prescriptions    None         Narinder Orellana      This note was created using Dictation Software.  This program may occasionally misinterpret certain words and phrases.      SCRIBE ATTESTATION NOTE:   I attest that I personally performed the services documented by the  scribe and acknowledged and confirm the content of the note.   Nurses notes were reviewed.  Narinder Harrell MD  10/16/23 1110       Narinder Orellana MD  10/16/23 2693

## 2023-10-22 ENCOUNTER — HOSPITAL ENCOUNTER (EMERGENCY)
Facility: HOSPITAL | Age: 63
Discharge: HOME | End: 2023-10-22
Attending: STUDENT IN AN ORGANIZED HEALTH CARE EDUCATION/TRAINING PROGRAM
Payer: MEDICAID

## 2023-10-22 VITALS
DIASTOLIC BLOOD PRESSURE: 109 MMHG | SYSTOLIC BLOOD PRESSURE: 179 MMHG | OXYGEN SATURATION: 99 % | TEMPERATURE: 99 F | RESPIRATION RATE: 20 BRPM | HEART RATE: 100 BPM

## 2023-10-22 DIAGNOSIS — Z00.00 GENERAL MEDICAL EXAM: Primary | ICD-10-CM

## 2023-10-22 PROCEDURE — 99281 EMR DPT VST MAYX REQ PHY/QHP: CPT

## 2023-10-22 NOTE — ED PROVIDER NOTES
"Encounter Date: 10/22/2023       History     Chief Complaint   Patient presents with    multiple compliants     C/o eye, back, hand and leg pain. Pt reports family member dropped him off to be seen. Ambulatory in triage. Denies SI/HI     60-year-old male who presents with "I went to the [facility] thousands of times and nobody can help me. "The patient says that he has been seen at [facility] many times and people or wasting is" time and money and energy. "He says that he was recently at [facility], obtain imaging and was told to leave.  He says that he was at a psychiatric facility "was given a shot and was told to leave. "He says he wants to [facility] psychiatric facility as well as [facility].  The patient's complains of bilateral eye pain, back pain and bilateral leg pain.  He says sometimes his right leg is more weak than the left.  This is not new, it has been going on for months.  No fevers.  He denies any suicide ideations, homicidal ideations, auditory visual hallucinations.  He denies any psychiatric illness.      Review of patient's allergies indicates:   Allergen Reactions    Doxycycline Other (See Comments)     Past Medical History:   Diagnosis Date    Anxiety disorder, unspecified     Hypertension     Paget disease of bone      No past surgical history on file.  Family History   Problem Relation Age of Onset    Diabetes Mother     Diabetes Father     Hypertension Sister     Hypertension Brother     Hypertension Sister      Social History     Tobacco Use    Smoking status: Every Day     Current packs/day: 1.00     Types: Cigarettes    Smokeless tobacco: Never    Tobacco comments:     Pt started smoking daily again this week after quitting for 6 years   Substance Use Topics    Alcohol use: Yes     Comment: daily past week    Drug use: No     Review of Systems    Physical Exam     Initial Vitals [10/22/23 0018]   BP Pulse Resp Temp SpO2   (!) 179/109 100 20 98.6 °F (37 °C) 99 %      MAP       --  "        Physical Exam    Constitutional:   Flat affect, masked facies, staring   HENT:   Head: Normocephalic and atraumatic.   Eyes: EOM are normal. Pupils are equal, round, and reactive to light.   No entrapment, no hyphema, no hypopyon, no ciliary flush   Neck: Neck supple.   Normal range of motion.  Cardiovascular:  Normal rate and regular rhythm.           Pulmonary/Chest: Breath sounds normal. No respiratory distress.   Abdominal: Abdomen is soft. There is no abdominal tenderness.   Musculoskeletal:         General: No tenderness or edema. Normal range of motion.      Cervical back: Normal range of motion and neck supple.     Neurological: He is alert. GCS score is 15. GCS eye subscore is 4. GCS verbal subscore is 5. GCS motor subscore is 6.   Patient is alert and oriented to person, place, time and situation.   Skin: Skin is warm and dry. Capillary refill takes less than 2 seconds.   Psychiatric:   Flat affect, staring, slow gait but able to do so.         ED Course   Procedures  Labs Reviewed - No data to display       Imaging Results    None          Medications - No data to display  Medical Decision Making  Hemodynamically stable. Afebrile. Phonating and protecting the airway spontaneously. No clinical evidence for cardiovascular instability or impending airway compromise. Examination as above. Prior medical records reviewed.  Patient multiple visits to the emergency department recently as well as psychiatric illnesses. Current co-morbidities considered that will impact clinical decision making include as above.    Plan:  Patient presenting with concern for possible psychiatric illness, Parkinson's disease.  He has been seen at multiple emergency departments as well as psychiatric facilities previously.  The patient says that no one can help him and that he keeps coming back although does not want to be here.  He repeats saying that he is on want to be here and that he can leave it in the time.  We had a  long and lengthy conversation regarding what we could get out of his visit today.  At 1 point I offered the patient MRI of his brain as I explained to him I was worried about possible movement disorders causing his concerns.  I explained to him that we would need to obtain blood work and he said no.  He attempted to further dictate how care was going to occur in the emergency department.  I tried to explain to him that that is not appropriate and not the way management can occur in a safe manner.  He became verbally disruptive and argumentative.  Patient then wanted to leave.  Security was called the patient was discharged in the emergency department and ambulated on his own out.                                 Clinical Impression:   Final diagnoses:  [Z00.00] General medical exam (Primary)        ED Disposition Condition    Discharge Stable          ED Prescriptions    None       Follow-up Information       Follow up With Specialties Details Why Contact Info    [facility] - Emergency Dept Emergency Medicine Go to  As needed [id number]  [facility] [address]  [phone]             Shorty Machuca MD  10/22/23 0048

## 2023-10-22 NOTE — DISCHARGE INSTRUCTIONS

## 2023-10-22 NOTE — ED TRIAGE NOTES
"Thaddeus Moss, a 63 y.o. male presents to the ED w/ complaint of shuffling gait, eye pain,     Triage note:  Chief Complaint   Patient presents with    multiple compliants     C/o eye, back, hand and leg pain. Pt reports family member dropped him off to be seen. Ambulatory in triage. Denies SI/HI     Review of patient's allergies indicates:   Allergen Reactions    Doxycycline Other (See Comments)     Past Medical History:   Diagnosis Date    Anxiety disorder, unspecified     Hypertension     Paget disease of bone        LOC: The patient is awake, alert, and oriented to self, place, time. Pt is calm but says things like "you have 30 seconds to get me a urine cup" and "they only get one shot to stick me [for blood]." Affect is flat.     APPEARANCE: Patient resting comfortably in no acute distress.  Patient is clean and well groomed.    SKIN: The visualized skin is dry; color consistent with ethnicity.      MUSCULOSKELETAL: Requested he be brought in using wheelchair and then stood up in the lobby and started walking. Shuffling gait. Reports diffuse body pain including extremities and back.     RESPIRATORY: Airway is open and patent. Respirations spontaneous, even, easy, and non-labored.  No audible wheeze. Patient has a normal effort and rate.  No accessory muscle use noted. Denies cough.     CARDIAC:  Significant hypertension at 179 systolic. Normal rate noted.  No peripheral edema noted.  No complaints of chest pain.      ABDOMEN: Supple. No distention noted. Pt denies abdominal pain; denies nausea, vomiting, diarrhea, or constipation.    NEUROLOGIC: Eyes open spontaneously.   Follows commands; facial expression symmetrical.  Purposeful motor response noted; normal sensation in all extremities. Reports headache and photophobia steady for "weeks."        "

## 2024-01-01 PROBLEM — Z13.9 ENCOUNTER FOR MEDICAL SCREENING EXAMINATION: Status: RESOLVED | Noted: 2023-09-26 | Resolved: 2024-01-01

## 2024-09-18 NOTE — ED NOTES
Pt informed of his pending transfer to [facility]. Pt informed that his family will be notified of his transfer. Pt verbalizes understanding. Pt allowed time to ask questions and have them answered. Pt instructed to quit yelling out of room, he states understanding.    supriya, PECwSF

## 2025-08-20 DIAGNOSIS — R97.20 ELEVATED PROSTATE SPECIFIC ANTIGEN (PSA): Primary | ICD-10-CM
